# Patient Record
Sex: MALE | Race: WHITE | Employment: FULL TIME | URBAN - NONMETROPOLITAN AREA
[De-identification: names, ages, dates, MRNs, and addresses within clinical notes are randomized per-mention and may not be internally consistent; named-entity substitution may affect disease eponyms.]

---

## 2018-08-16 ENCOUNTER — HOSPITAL ENCOUNTER (EMERGENCY)
Age: 73
Discharge: HOME | End: 2018-08-16
Payer: MEDICARE

## 2018-08-16 ENCOUNTER — APPOINTMENT (OUTPATIENT)
Dept: CT IMAGING | Age: 73
End: 2018-08-16
Payer: MEDICARE

## 2018-08-16 VITALS
HEIGHT: 72 IN | WEIGHT: 165 LBS | DIASTOLIC BLOOD PRESSURE: 82 MMHG | HEART RATE: 52 BPM | TEMPERATURE: 98.5 F | OXYGEN SATURATION: 94 % | RESPIRATION RATE: 18 BRPM | SYSTOLIC BLOOD PRESSURE: 148 MMHG | BODY MASS INDEX: 22.35 KG/M2

## 2018-08-16 DIAGNOSIS — R03.0 ELEVATED BP WITHOUT DIAGNOSIS OF HYPERTENSION: ICD-10-CM

## 2018-08-16 DIAGNOSIS — R42 VERTIGO: Primary | ICD-10-CM

## 2018-08-16 LAB
ALBUMIN SERPL-MCNC: 4.2 G/DL (ref 3.5–5.2)
ALP BLD-CCNC: 79 U/L (ref 40–130)
ALT SERPL-CCNC: 21 U/L (ref 5–41)
ANION GAP SERPL CALCULATED.3IONS-SCNC: 14 MMOL/L (ref 7–19)
AST SERPL-CCNC: 20 U/L (ref 5–40)
BASOPHILS ABSOLUTE: 0.1 K/UL (ref 0–0.2)
BASOPHILS RELATIVE PERCENT: 0.9 % (ref 0–1)
BILIRUB SERPL-MCNC: 0.4 MG/DL (ref 0.2–1.2)
BILIRUBIN URINE: NEGATIVE
BLOOD, URINE: NEGATIVE
BUN BLDV-MCNC: 22 MG/DL (ref 8–23)
CALCIUM SERPL-MCNC: 8.9 MG/DL (ref 8.8–10.2)
CHLORIDE BLD-SCNC: 102 MMOL/L (ref 98–111)
CLARITY: ABNORMAL
CO2: 25 MMOL/L (ref 22–29)
COLOR: YELLOW
CREAT SERPL-MCNC: 1.2 MG/DL (ref 0.5–1.2)
EOSINOPHILS ABSOLUTE: 0.1 K/UL (ref 0–0.6)
EOSINOPHILS RELATIVE PERCENT: 2.4 % (ref 0–5)
GFR NON-AFRICAN AMERICAN: 60
GLUCOSE BLD-MCNC: 106 MG/DL (ref 74–109)
GLUCOSE URINE: NEGATIVE MG/DL
HCT VFR BLD CALC: 42.9 % (ref 42–52)
HEMOGLOBIN: 14.4 G/DL (ref 14–18)
KETONES, URINE: NEGATIVE MG/DL
LEUKOCYTE ESTERASE, URINE: NEGATIVE
LYMPHOCYTES ABSOLUTE: 1.8 K/UL (ref 1.1–4.5)
LYMPHOCYTES RELATIVE PERCENT: 33 % (ref 20–40)
MCH RBC QN AUTO: 28.7 PG (ref 27–31)
MCHC RBC AUTO-ENTMCNC: 33.6 G/DL (ref 33–37)
MCV RBC AUTO: 85.6 FL (ref 80–94)
MONOCYTES ABSOLUTE: 0.4 K/UL (ref 0–0.9)
MONOCYTES RELATIVE PERCENT: 7.3 % (ref 0–10)
NEUTROPHILS ABSOLUTE: 3 K/UL (ref 1.5–7.5)
NEUTROPHILS RELATIVE PERCENT: 56.2 % (ref 50–65)
NITRITE, URINE: NEGATIVE
PDW BLD-RTO: 12.3 % (ref 11.5–14.5)
PH UA: 7.5
PLATELET # BLD: 188 K/UL (ref 130–400)
PMV BLD AUTO: 9.5 FL (ref 9.4–12.4)
POTASSIUM SERPL-SCNC: 4.2 MMOL/L (ref 3.5–5)
PROTEIN UA: NEGATIVE MG/DL
RBC # BLD: 5.01 M/UL (ref 4.7–6.1)
SODIUM BLD-SCNC: 141 MMOL/L (ref 136–145)
SPECIFIC GRAVITY UA: 1.02
TOTAL PROTEIN: 7.1 G/DL (ref 6.6–8.7)
TROPONIN: <0.01 NG/ML (ref 0–0.03)
URINE REFLEX TO CULTURE: ABNORMAL
UROBILINOGEN, URINE: 1 E.U./DL
WBC # BLD: 5.4 K/UL (ref 4.8–10.8)

## 2018-08-16 PROCEDURE — 6360000002 HC RX W HCPCS: Performed by: NURSE PRACTITIONER

## 2018-08-16 PROCEDURE — 99284 EMERGENCY DEPT VISIT MOD MDM: CPT | Performed by: NURSE PRACTITIONER

## 2018-08-16 PROCEDURE — 99284 EMERGENCY DEPT VISIT MOD MDM: CPT

## 2018-08-16 PROCEDURE — 6370000000 HC RX 637 (ALT 250 FOR IP): Performed by: NURSE PRACTITIONER

## 2018-08-16 PROCEDURE — 80053 COMPREHEN METABOLIC PANEL: CPT

## 2018-08-16 PROCEDURE — 70450 CT HEAD/BRAIN W/O DYE: CPT

## 2018-08-16 PROCEDURE — 2580000003 HC RX 258: Performed by: NURSE PRACTITIONER

## 2018-08-16 PROCEDURE — 84484 ASSAY OF TROPONIN QUANT: CPT

## 2018-08-16 PROCEDURE — 93005 ELECTROCARDIOGRAM TRACING: CPT

## 2018-08-16 PROCEDURE — 36415 COLL VENOUS BLD VENIPUNCTURE: CPT

## 2018-08-16 PROCEDURE — 85025 COMPLETE CBC W/AUTO DIFF WBC: CPT

## 2018-08-16 PROCEDURE — 96374 THER/PROPH/DIAG INJ IV PUSH: CPT

## 2018-08-16 PROCEDURE — 81003 URINALYSIS AUTO W/O SCOPE: CPT

## 2018-08-16 RX ORDER — MECLIZINE HYDROCHLORIDE 25 MG/1
25 TABLET ORAL 3 TIMES DAILY PRN
Qty: 20 TABLET | Refills: 0 | Status: SHIPPED | OUTPATIENT
Start: 2018-08-16 | End: 2018-08-23

## 2018-08-16 RX ORDER — PANTOPRAZOLE SODIUM 40 MG/1
40 GRANULE, DELAYED RELEASE ORAL
COMMUNITY

## 2018-08-16 RX ORDER — ONDANSETRON 2 MG/ML
4 INJECTION INTRAMUSCULAR; INTRAVENOUS ONCE
Status: COMPLETED | OUTPATIENT
Start: 2018-08-16 | End: 2018-08-16

## 2018-08-16 RX ORDER — MECLIZINE HCL 12.5 MG/1
25 TABLET ORAL ONCE
Status: COMPLETED | OUTPATIENT
Start: 2018-08-16 | End: 2018-08-16

## 2018-08-16 RX ORDER — 0.9 % SODIUM CHLORIDE 0.9 %
1000 INTRAVENOUS SOLUTION INTRAVENOUS ONCE
Status: COMPLETED | OUTPATIENT
Start: 2018-08-16 | End: 2018-08-16

## 2018-08-16 RX ADMIN — ONDANSETRON 4 MG: 2 INJECTION INTRAMUSCULAR; INTRAVENOUS at 18:44

## 2018-08-16 RX ADMIN — SODIUM CHLORIDE 1000 ML: 9 INJECTION, SOLUTION INTRAVENOUS at 18:44

## 2018-08-16 RX ADMIN — MECLIZINE 25 MG: 12.5 TABLET ORAL at 18:44

## 2018-08-16 ASSESSMENT — ENCOUNTER SYMPTOMS: RESPIRATORY NEGATIVE: 1

## 2018-08-16 NOTE — ED PROVIDER NOTES
[facility]  eMERGENCY dEPARTMENT eNCOUnter      Pt Name: Isra Crouch  MRN: [id number]  [place] 1945  Date of evaluation: 8/16/2018  Provider: Darion Fletcher, [address]       Chief Complaint   Patient presents with    Dizziness     worse when pt gets up          HISTORY OF PRESENT ILLNESS   (Location/Symptom, Timing/Onset, Context/Setting, Quality, Duration, Modifying Factors, Severity)  Note limiting factors. Isra Crouch is a 81 y.o. male who presents to the emergency department for evaluation of dizziness. Pt tells me that he has had dizziness described as \"feeling like I'm drunk\" with imbalance and sometimes associated with spinning sensation. He relates that symptoms began 3 days ago are intermittent lasting several minutes in duration and occur with position changes. He tells me that he has had headache today along both sides of scalp that has improved with otc ibuprofen. He has had no vision change or lateralizing numbness weakness. He denies fever as well as head injury. He has had no lateralizing numbness/weakness. He has had no chest pain or shortness of breath. He denies history of diabetes as well as any vomiting/diarrhea. HPI    Nursing Notes were reviewed. REVIEW OF SYSTEMS    (2-9 systems for level 4, 10 or more for level 5)     Review of Systems   Constitutional: Negative. Respiratory: Negative. Cardiovascular: Negative. Neurological: Positive for dizziness. A complete review of systems was performed and is negative except as noted above in the HPI.        PAST MEDICAL HISTORY     Past Medical History:   Diagnosis Date    GERD (gastroesophageal reflux disease)          SURGICAL HISTORY       Past Surgical History:   Procedure Laterality Date    FOOT SURGERY Right          CURRENT MEDICATIONS       Previous Medications    PANTOPRAZOLE SODIUM (PROTONIX) 40 MG PACK PACKET    Take 40 mg by mouth every morning (before breakfast)       ALLERGIES

## 2018-08-19 LAB
EKG P AXIS: 66 DEGREES
EKG P-R INTERVAL: 164 MS
EKG Q-T INTERVAL: 454 MS
EKG QRS DURATION: 146 MS
EKG QTC CALCULATION (BAZETT): 447 MS
EKG T AXIS: 36 DEGREES

## 2018-08-22 ENCOUNTER — OFFICE VISIT (OUTPATIENT)
Dept: OTOLARYNGOLOGY | Age: 73
End: 2018-08-22
Payer: MEDICARE

## 2018-08-22 VITALS
HEART RATE: 59 BPM | HEIGHT: 72 IN | DIASTOLIC BLOOD PRESSURE: 70 MMHG | TEMPERATURE: 98.7 F | BODY MASS INDEX: 27.22 KG/M2 | SYSTOLIC BLOOD PRESSURE: 116 MMHG | WEIGHT: 201 LBS | RESPIRATION RATE: 16 BRPM | OXYGEN SATURATION: 98 %

## 2018-08-22 DIAGNOSIS — R42 VERTIGO: Primary | ICD-10-CM

## 2018-08-22 DIAGNOSIS — R42 DIZZINESS: ICD-10-CM

## 2018-08-22 PROCEDURE — 99203 OFFICE O/P NEW LOW 30 MIN: CPT | Performed by: OTOLARYNGOLOGY

## 2018-08-22 NOTE — PROGRESS NOTES
[facility]  [address] [place] [address]  Dept: [phone]  Dept Fax: [phone]: [phone]    Merrick Salazar is a 81 y.o. male who presents today for his medical conditions/complaints as noted below. Merrick Salazar is c/o of New Patient (Referred by ED for vertigo; patient reports it started 1 week ago )      Past Medical History:   Diagnosis Date    GERD (gastroesophageal reflux disease)       Past Surgical History:   Procedure Laterality Date    FOOT SURGERY Right        History reviewed. No pertinent family history. Social History   Substance Use Topics    Smoking status: Former Smoker    Smokeless tobacco: Never Used    Alcohol use No      Current Outpatient Prescriptions   Medication Sig Dispense Refill    pantoprazole sodium (PROTONIX) 40 MG PACK packet Take 40 mg by mouth every morning (before breakfast)      meclizine (ANTIVERT) 25 MG tablet Take 1 tablet by mouth 3 times daily as needed for Dizziness or Nausea 20 tablet 0     No current facility-administered medications for this visit. No Known Allergies    Subjective:    BPV symptoms  The patient is a 81 y.o. with a recent history dizziness characterized by a sense of motion or rotational vertigo. The patient may precipitate the onset of dizziness by moving their head rapidly or changing position rapidly. The dizziness is short lived and usually subsides within 30 seconds. The intensity of the dizziness has not increased. The patient has no other associated neurologic symptoms. There has had no abrupt alteration in hearing or significant alteration in level of tinnitus. There has been no loss of consciousness and no associated headache. Review of Systems  A 12 point review of systems was completed, reviewed, and scanned to chart per staff. Patient medical history reviewed.     Objective:     Physical Exam   Constitutional: He is oriented to person, place, and

## 2021-05-21 ENCOUNTER — IMMUNIZATION (OUTPATIENT)
Age: 76
End: 2021-05-21
Payer: MEDICARE

## 2021-05-21 PROCEDURE — 0001A PR IMM ADMN SARSCOV2 30MCG/0.3ML DIL RECON 1ST DOSE: CPT | Performed by: FAMILY MEDICINE

## 2021-05-21 PROCEDURE — 91300 COVID-19, PFIZER VACCINE 30MCG/0.3ML DOSE: CPT | Performed by: FAMILY MEDICINE

## 2021-06-11 ENCOUNTER — IMMUNIZATION (OUTPATIENT)
Age: 76
End: 2021-06-11
Payer: MEDICARE

## 2021-06-11 PROCEDURE — 91300 COVID-19, PFIZER VACCINE 30MCG/0.3ML DOSE: CPT | Performed by: FAMILY MEDICINE

## 2021-06-11 PROCEDURE — 0002A COVID-19, PFIZER VACCINE 30MCG/0.3ML DOSE: CPT | Performed by: FAMILY MEDICINE

## 2022-11-19 ENCOUNTER — APPOINTMENT (OUTPATIENT)
Dept: CARDIAC CATH/INVASIVE PROCEDURES | Age: 77
DRG: 247 | End: 2022-11-19
Payer: MEDICARE

## 2022-11-19 ENCOUNTER — HOSPITAL ENCOUNTER (INPATIENT)
Age: 77
LOS: 1 days | Discharge: HOME | DRG: 247 | End: 2022-11-20
Attending: EMERGENCY MEDICINE | Admitting: HOSPITALIST
Payer: MEDICARE

## 2022-11-19 ENCOUNTER — APPOINTMENT (OUTPATIENT)
Dept: GENERAL RADIOLOGY | Age: 77
DRG: 247 | End: 2022-11-19
Payer: MEDICARE

## 2022-11-19 DIAGNOSIS — R07.9 CHEST PAIN, UNSPECIFIED TYPE: ICD-10-CM

## 2022-11-19 DIAGNOSIS — R77.8 ELEVATED TROPONIN: Primary | ICD-10-CM

## 2022-11-19 PROBLEM — R79.89 ELEVATED TROPONIN: Status: ACTIVE | Noted: 2022-11-19

## 2022-11-19 PROBLEM — I21.4 NSTEMI (NON-ST ELEVATED MYOCARDIAL INFARCTION) (HCC): Status: ACTIVE | Noted: 2022-11-19

## 2022-11-19 LAB
ALBUMIN SERPL-MCNC: 4.3 G/DL (ref 3.5–5.2)
ALP BLD-CCNC: 95 U/L (ref 40–130)
ALT SERPL-CCNC: 18 U/L (ref 5–41)
ANION GAP SERPL CALCULATED.3IONS-SCNC: 10 MMOL/L (ref 7–19)
ANION GAP SERPL CALCULATED.3IONS-SCNC: 6 MMOL/L (ref 7–19)
APTT: 117.5 SEC (ref 26–36.2)
APTT: 32.2 SEC (ref 26–36.2)
APTT: 67.6 SEC (ref 26–36.2)
AST SERPL-CCNC: 20 U/L (ref 5–40)
BASOPHILS ABSOLUTE: 0 K/UL (ref 0–0.2)
BASOPHILS RELATIVE PERCENT: 0.8 % (ref 0–1)
BILIRUB SERPL-MCNC: 0.3 MG/DL (ref 0.2–1.2)
BUN BLDV-MCNC: 20 MG/DL (ref 8–23)
BUN BLDV-MCNC: 21 MG/DL (ref 8–23)
CALCIUM SERPL-MCNC: 8.8 MG/DL (ref 8.8–10.2)
CALCIUM SERPL-MCNC: 9.2 MG/DL (ref 8.8–10.2)
CHLORIDE BLD-SCNC: 103 MMOL/L (ref 98–111)
CHLORIDE BLD-SCNC: 103 MMOL/L (ref 98–111)
CHOLESTEROL, TOTAL: 207 MG/DL (ref 160–199)
CO2: 26 MMOL/L (ref 22–29)
CO2: 28 MMOL/L (ref 22–29)
CREAT SERPL-MCNC: 0.9 MG/DL (ref 0.5–1.2)
CREAT SERPL-MCNC: 0.9 MG/DL (ref 0.5–1.2)
EOSINOPHILS ABSOLUTE: 0.3 K/UL (ref 0–0.6)
EOSINOPHILS RELATIVE PERCENT: 6.4 % (ref 0–5)
GFR SERPL CREATININE-BSD FRML MDRD: >60 ML/MIN/{1.73_M2}
GFR SERPL CREATININE-BSD FRML MDRD: >60 ML/MIN/{1.73_M2}
GLUCOSE BLD-MCNC: 103 MG/DL (ref 74–109)
GLUCOSE BLD-MCNC: 136 MG/DL (ref 74–109)
HBA1C MFR BLD: 5.6 % (ref 4–6)
HCT VFR BLD CALC: 44.4 % (ref 42–52)
HDLC SERPL-MCNC: 41 MG/DL (ref 55–121)
HEMOGLOBIN: 15.1 G/DL (ref 14–18)
IMMATURE GRANULOCYTES #: 0 K/UL
INR BLD: 1.01 (ref 0.88–1.18)
LDL CHOLESTEROL CALCULATED: 156 MG/DL
LIPASE: 23 U/L (ref 13–60)
LYMPHOCYTES ABSOLUTE: 1.9 K/UL (ref 1.1–4.5)
LYMPHOCYTES RELATIVE PERCENT: 35 % (ref 20–40)
MAGNESIUM: 2.2 MG/DL (ref 1.6–2.4)
MCH RBC QN AUTO: 29.5 PG (ref 27–31)
MCHC RBC AUTO-ENTMCNC: 34 G/DL (ref 33–37)
MCV RBC AUTO: 86.9 FL (ref 80–94)
MONOCYTES ABSOLUTE: 0.5 K/UL (ref 0–0.9)
MONOCYTES RELATIVE PERCENT: 10.2 % (ref 0–10)
NEUTROPHILS ABSOLUTE: 2.5 K/UL (ref 1.5–7.5)
NEUTROPHILS RELATIVE PERCENT: 47.2 % (ref 50–65)
PDW BLD-RTO: 12.5 % (ref 11.5–14.5)
PHOSPHORUS: 2.9 MG/DL (ref 2.5–4.5)
PLATELET # BLD: 222 K/UL (ref 130–400)
PMV BLD AUTO: 9.4 FL (ref 9.4–12.4)
POTASSIUM REFLEX MAGNESIUM: 4.5 MMOL/L (ref 3.5–5)
POTASSIUM SERPL-SCNC: 4.2 MMOL/L (ref 3.5–5)
PRO-BNP: 89 PG/ML (ref 0–1800)
PROTHROMBIN TIME: 13.2 SEC (ref 12–14.6)
RBC # BLD: 5.11 M/UL (ref 4.7–6.1)
SARS-COV-2, NAAT: NOT DETECTED
SODIUM BLD-SCNC: 137 MMOL/L (ref 136–145)
SODIUM BLD-SCNC: 139 MMOL/L (ref 136–145)
TOTAL PROTEIN: 7.1 G/DL (ref 6.6–8.7)
TRIGL SERPL-MCNC: 49 MG/DL (ref 0–149)
TROPONIN: 0.08 NG/ML (ref 0–0.03)
TROPONIN: 0.14 NG/ML (ref 0–0.03)
TROPONIN: 0.23 NG/ML (ref 0–0.03)
TROPONIN: 0.23 NG/ML (ref 0–0.03)
TROPONIN: <0.01 NG/ML (ref 0–0.03)
TSH REFLEX FT4: 3.24 UIU/ML (ref 0.35–5.5)
WBC # BLD: 5.3 K/UL (ref 4.8–10.8)

## 2022-11-19 PROCEDURE — 83036 HEMOGLOBIN GLYCOSYLATED A1C: CPT

## 2022-11-19 PROCEDURE — 6370000000 HC RX 637 (ALT 250 FOR IP)

## 2022-11-19 PROCEDURE — 84443 ASSAY THYROID STIM HORMONE: CPT

## 2022-11-19 PROCEDURE — 85025 COMPLETE CBC W/AUTO DIFF WBC: CPT

## 2022-11-19 PROCEDURE — 36415 COLL VENOUS BLD VENIPUNCTURE: CPT

## 2022-11-19 PROCEDURE — C1894 INTRO/SHEATH, NON-LASER: HCPCS

## 2022-11-19 PROCEDURE — B2151ZZ FLUOROSCOPY OF LEFT HEART USING LOW OSMOLAR CONTRAST: ICD-10-PCS | Performed by: INTERNAL MEDICINE

## 2022-11-19 PROCEDURE — 83690 ASSAY OF LIPASE: CPT

## 2022-11-19 PROCEDURE — C1725 CATH, TRANSLUMIN NON-LASER: HCPCS

## 2022-11-19 PROCEDURE — 6360000002 HC RX W HCPCS: Performed by: EMERGENCY MEDICINE

## 2022-11-19 PROCEDURE — 80053 COMPREHEN METABOLIC PANEL: CPT

## 2022-11-19 PROCEDURE — 84100 ASSAY OF PHOSPHORUS: CPT

## 2022-11-19 PROCEDURE — 93458 L HRT ARTERY/VENTRICLE ANGIO: CPT | Performed by: INTERNAL MEDICINE

## 2022-11-19 PROCEDURE — 80061 LIPID PANEL: CPT

## 2022-11-19 PROCEDURE — C1887 CATHETER, GUIDING: HCPCS

## 2022-11-19 PROCEDURE — 6370000000 HC RX 637 (ALT 250 FOR IP): Performed by: INTERNAL MEDICINE

## 2022-11-19 PROCEDURE — 92928 PRQ TCAT PLMT NTRAC ST 1 LES: CPT | Performed by: INTERNAL MEDICINE

## 2022-11-19 PROCEDURE — 92928 PRQ TCAT PLMT NTRAC ST 1 LES: CPT

## 2022-11-19 PROCEDURE — 99222 1ST HOSP IP/OBS MODERATE 55: CPT | Performed by: INTERNAL MEDICINE

## 2022-11-19 PROCEDURE — C1769 GUIDE WIRE: HCPCS

## 2022-11-19 PROCEDURE — 87635 SARS-COV-2 COVID-19 AMP PRB: CPT

## 2022-11-19 PROCEDURE — 2500000003 HC RX 250 WO HCPCS

## 2022-11-19 PROCEDURE — 83880 ASSAY OF NATRIURETIC PEPTIDE: CPT

## 2022-11-19 PROCEDURE — 99285 EMERGENCY DEPT VISIT HI MDM: CPT

## 2022-11-19 PROCEDURE — C1874 STENT, COATED/COV W/DEL SYS: HCPCS

## 2022-11-19 PROCEDURE — 6360000004 HC RX CONTRAST MEDICATION: Performed by: STUDENT IN AN ORGANIZED HEALTH CARE EDUCATION/TRAINING PROGRAM

## 2022-11-19 PROCEDURE — 2580000003 HC RX 258: Performed by: INTERNAL MEDICINE

## 2022-11-19 PROCEDURE — 2709999900 HC NON-CHARGEABLE SUPPLY

## 2022-11-19 PROCEDURE — 83735 ASSAY OF MAGNESIUM: CPT

## 2022-11-19 PROCEDURE — 93458 L HRT ARTERY/VENTRICLE ANGIO: CPT

## 2022-11-19 PROCEDURE — 96375 TX/PRO/DX INJ NEW DRUG ADDON: CPT

## 2022-11-19 PROCEDURE — 93005 ELECTROCARDIOGRAM TRACING: CPT | Performed by: INTERNAL MEDICINE

## 2022-11-19 PROCEDURE — 99153 MOD SED SAME PHYS/QHP EA: CPT

## 2022-11-19 PROCEDURE — 84484 ASSAY OF TROPONIN QUANT: CPT

## 2022-11-19 PROCEDURE — 2140000000 HC CCU INTERMEDIATE R&B

## 2022-11-19 PROCEDURE — 6370000000 HC RX 637 (ALT 250 FOR IP): Performed by: EMERGENCY MEDICINE

## 2022-11-19 PROCEDURE — 92929 HC PRQ CARD STENT W/ANGIO ADDL: CPT

## 2022-11-19 PROCEDURE — B2111ZZ FLUOROSCOPY OF MULTIPLE CORONARY ARTERIES USING LOW OSMOLAR CONTRAST: ICD-10-PCS | Performed by: INTERNAL MEDICINE

## 2022-11-19 PROCEDURE — 99152 MOD SED SAME PHYS/QHP 5/>YRS: CPT | Performed by: INTERNAL MEDICINE

## 2022-11-19 PROCEDURE — 6370000000 HC RX 637 (ALT 250 FOR IP): Performed by: HOSPITALIST

## 2022-11-19 PROCEDURE — 71045 X-RAY EXAM CHEST 1 VIEW: CPT

## 2022-11-19 PROCEDURE — 027236Z DILATION OF CORONARY ARTERY, THREE ARTERIES WITH THREE DRUG-ELUTING INTRALUMINAL DEVICES, PERCUTANEOUS APPROACH: ICD-10-PCS | Performed by: INTERNAL MEDICINE

## 2022-11-19 PROCEDURE — 85730 THROMBOPLASTIN TIME PARTIAL: CPT

## 2022-11-19 PROCEDURE — 96365 THER/PROPH/DIAG IV INF INIT: CPT

## 2022-11-19 PROCEDURE — 93005 ELECTROCARDIOGRAM TRACING: CPT | Performed by: EMERGENCY MEDICINE

## 2022-11-19 PROCEDURE — 6360000002 HC RX W HCPCS

## 2022-11-19 PROCEDURE — 92929 PR PRQ TRLUML CORONARY STENT W/ANGIO ADDL ART/BRNCH: CPT | Performed by: INTERNAL MEDICINE

## 2022-11-19 PROCEDURE — 4A023N7 MEASUREMENT OF CARDIAC SAMPLING AND PRESSURE, LEFT HEART, PERCUTANEOUS APPROACH: ICD-10-PCS | Performed by: INTERNAL MEDICINE

## 2022-11-19 PROCEDURE — 85610 PROTHROMBIN TIME: CPT

## 2022-11-19 PROCEDURE — 99152 MOD SED SAME PHYS/QHP 5/>YRS: CPT

## 2022-11-19 RX ORDER — POTASSIUM CHLORIDE 7.45 MG/ML
10 INJECTION INTRAVENOUS PRN
Status: DISCONTINUED | OUTPATIENT
Start: 2022-11-19 | End: 2022-11-20 | Stop reason: HOSPADM

## 2022-11-19 RX ORDER — SODIUM CHLORIDE 0.9 % (FLUSH) 0.9 %
5-40 SYRINGE (ML) INJECTION EVERY 12 HOURS SCHEDULED
Status: DISCONTINUED | OUTPATIENT
Start: 2022-11-19 | End: 2022-11-20 | Stop reason: HOSPADM

## 2022-11-19 RX ORDER — ONDANSETRON 4 MG/1
4 TABLET, ORALLY DISINTEGRATING ORAL EVERY 8 HOURS PRN
Status: DISCONTINUED | OUTPATIENT
Start: 2022-11-19 | End: 2022-11-20 | Stop reason: HOSPADM

## 2022-11-19 RX ORDER — ASPIRIN 325 MG
325 TABLET ORAL ONCE
Status: COMPLETED | OUTPATIENT
Start: 2022-11-19 | End: 2022-11-19

## 2022-11-19 RX ORDER — NITROGLYCERIN 0.4 MG/1
0.4 TABLET SUBLINGUAL EVERY 5 MIN PRN
Status: DISCONTINUED | OUTPATIENT
Start: 2022-11-19 | End: 2022-11-20 | Stop reason: HOSPADM

## 2022-11-19 RX ORDER — MECOBALAMIN 5000 MCG
5 TABLET,DISINTEGRATING ORAL NIGHTLY PRN
Status: DISCONTINUED | OUTPATIENT
Start: 2022-11-19 | End: 2022-11-20 | Stop reason: HOSPADM

## 2022-11-19 RX ORDER — ACETAMINOPHEN 325 MG/1
650 TABLET ORAL EVERY 4 HOURS PRN
Status: DISCONTINUED | OUTPATIENT
Start: 2022-11-19 | End: 2022-11-20 | Stop reason: HOSPADM

## 2022-11-19 RX ORDER — POLYETHYLENE GLYCOL 3350 17 G/17G
17 POWDER, FOR SOLUTION ORAL DAILY PRN
Status: DISCONTINUED | OUTPATIENT
Start: 2022-11-19 | End: 2022-11-20 | Stop reason: HOSPADM

## 2022-11-19 RX ORDER — SODIUM CHLORIDE 9 MG/ML
INJECTION, SOLUTION INTRAVENOUS PRN
Status: DISCONTINUED | OUTPATIENT
Start: 2022-11-19 | End: 2022-11-20 | Stop reason: HOSPADM

## 2022-11-19 RX ORDER — ACETAMINOPHEN 650 MG/1
650 SUPPOSITORY RECTAL EVERY 4 HOURS PRN
Status: DISCONTINUED | OUTPATIENT
Start: 2022-11-19 | End: 2022-11-20 | Stop reason: HOSPADM

## 2022-11-19 RX ORDER — POTASSIUM CHLORIDE 20 MEQ/1
40 TABLET, EXTENDED RELEASE ORAL PRN
Status: DISCONTINUED | OUTPATIENT
Start: 2022-11-19 | End: 2022-11-20 | Stop reason: HOSPADM

## 2022-11-19 RX ORDER — ONDANSETRON 2 MG/ML
4 INJECTION INTRAMUSCULAR; INTRAVENOUS EVERY 6 HOURS PRN
Status: DISCONTINUED | OUTPATIENT
Start: 2022-11-19 | End: 2022-11-20 | Stop reason: HOSPADM

## 2022-11-19 RX ORDER — ASPIRIN 81 MG/1
81 TABLET, CHEWABLE ORAL DAILY
Status: DISCONTINUED | OUTPATIENT
Start: 2022-11-20 | End: 2022-11-20

## 2022-11-19 RX ORDER — SODIUM CHLORIDE 0.9 % (FLUSH) 0.9 %
5-40 SYRINGE (ML) INJECTION PRN
Status: DISCONTINUED | OUTPATIENT
Start: 2022-11-19 | End: 2022-11-20 | Stop reason: HOSPADM

## 2022-11-19 RX ORDER — CALCIUM CARBONATE 200(500)MG
500 TABLET,CHEWABLE ORAL 3 TIMES DAILY PRN
Status: DISCONTINUED | OUTPATIENT
Start: 2022-11-19 | End: 2022-11-20 | Stop reason: HOSPADM

## 2022-11-19 RX ORDER — ASPIRIN 81 MG/1
81 TABLET ORAL DAILY
Status: DISCONTINUED | OUTPATIENT
Start: 2022-11-20 | End: 2022-11-20 | Stop reason: HOSPADM

## 2022-11-19 RX ORDER — HEPARIN SODIUM 1000 [USP'U]/ML
4000 INJECTION, SOLUTION INTRAVENOUS; SUBCUTANEOUS PRN
Status: DISCONTINUED | OUTPATIENT
Start: 2022-11-19 | End: 2022-11-20 | Stop reason: HOSPADM

## 2022-11-19 RX ORDER — ONDANSETRON 2 MG/ML
4 INJECTION INTRAMUSCULAR; INTRAVENOUS ONCE
Status: COMPLETED | OUTPATIENT
Start: 2022-11-19 | End: 2022-11-19

## 2022-11-19 RX ORDER — SODIUM CHLORIDE 9 MG/ML
INJECTION, SOLUTION INTRAVENOUS CONTINUOUS
Status: ACTIVE | OUTPATIENT
Start: 2022-11-19 | End: 2022-11-19

## 2022-11-19 RX ORDER — CLOPIDOGREL BISULFATE 75 MG/1
75 TABLET ORAL DAILY
Status: DISCONTINUED | OUTPATIENT
Start: 2022-11-20 | End: 2022-11-20 | Stop reason: HOSPADM

## 2022-11-19 RX ORDER — ATORVASTATIN CALCIUM 40 MG/1
80 TABLET, FILM COATED ORAL NIGHTLY
Status: DISCONTINUED | OUTPATIENT
Start: 2022-11-19 | End: 2022-11-20 | Stop reason: HOSPADM

## 2022-11-19 RX ORDER — HEPARIN SODIUM 1000 [USP'U]/ML
4000 INJECTION, SOLUTION INTRAVENOUS; SUBCUTANEOUS ONCE
Status: COMPLETED | OUTPATIENT
Start: 2022-11-19 | End: 2022-11-19

## 2022-11-19 RX ORDER — FAMOTIDINE 20 MG/1
20 TABLET, FILM COATED ORAL 2 TIMES DAILY
Status: DISCONTINUED | OUTPATIENT
Start: 2022-11-19 | End: 2022-11-20 | Stop reason: HOSPADM

## 2022-11-19 RX ORDER — HEPARIN SODIUM 1000 [USP'U]/ML
2000 INJECTION, SOLUTION INTRAVENOUS; SUBCUTANEOUS PRN
Status: DISCONTINUED | OUTPATIENT
Start: 2022-11-19 | End: 2022-11-20 | Stop reason: HOSPADM

## 2022-11-19 RX ORDER — METOPROLOL SUCCINATE 25 MG/1
25 TABLET, EXTENDED RELEASE ORAL DAILY
Status: DISCONTINUED | OUTPATIENT
Start: 2022-11-19 | End: 2022-11-20 | Stop reason: HOSPADM

## 2022-11-19 RX ORDER — HEPARIN SODIUM 10000 [USP'U]/100ML
5-30 INJECTION, SOLUTION INTRAVENOUS CONTINUOUS
Status: DISCONTINUED | OUTPATIENT
Start: 2022-11-19 | End: 2022-11-19

## 2022-11-19 RX ORDER — VALSARTAN 80 MG/1
80 TABLET ORAL DAILY
Status: DISCONTINUED | OUTPATIENT
Start: 2022-11-19 | End: 2022-11-20

## 2022-11-19 RX ORDER — ESOMEPRAZOLE MAGNESIUM 20 MG/1
20 FOR SUSPENSION ORAL PRN
COMMUNITY

## 2022-11-19 RX ORDER — MAGNESIUM SULFATE IN WATER 40 MG/ML
2000 INJECTION, SOLUTION INTRAVENOUS PRN
Status: DISCONTINUED | OUTPATIENT
Start: 2022-11-19 | End: 2022-11-20 | Stop reason: HOSPADM

## 2022-11-19 RX ORDER — MORPHINE SULFATE 4 MG/ML
4 INJECTION, SOLUTION INTRAMUSCULAR; INTRAVENOUS ONCE
Status: COMPLETED | OUTPATIENT
Start: 2022-11-19 | End: 2022-11-19

## 2022-11-19 RX ADMIN — MORPHINE SULFATE 4 MG: 4 INJECTION, SOLUTION INTRAMUSCULAR; INTRAVENOUS at 01:07

## 2022-11-19 RX ADMIN — FAMOTIDINE 20 MG: 20 TABLET ORAL at 22:05

## 2022-11-19 RX ADMIN — METOPROLOL SUCCINATE 25 MG: 25 TABLET, EXTENDED RELEASE ORAL at 16:57

## 2022-11-19 RX ADMIN — NITROGLYCERIN 0.4 MG: 0.4 TABLET, ORALLY DISINTEGRATING SUBLINGUAL at 01:15

## 2022-11-19 RX ADMIN — HEPARIN SODIUM 12 UNITS/KG/HR: 10000 INJECTION, SOLUTION INTRAVENOUS at 04:14

## 2022-11-19 RX ADMIN — SODIUM CHLORIDE, PRESERVATIVE FREE 10 ML: 5 INJECTION INTRAVENOUS at 22:05

## 2022-11-19 RX ADMIN — FAMOTIDINE 20 MG: 20 TABLET ORAL at 08:50

## 2022-11-19 RX ADMIN — ASPIRIN 325 MG: 325 TABLET ORAL at 01:08

## 2022-11-19 RX ADMIN — IOPAMIDOL 275 ML: 612 INJECTION, SOLUTION INTRAVENOUS at 12:49

## 2022-11-19 RX ADMIN — VALSARTAN 80 MG: 80 TABLET, FILM COATED ORAL at 16:57

## 2022-11-19 RX ADMIN — ATORVASTATIN CALCIUM 80 MG: 40 TABLET, FILM COATED ORAL at 22:05

## 2022-11-19 RX ADMIN — HEPARIN SODIUM 4000 UNITS: 1000 INJECTION INTRAVENOUS; SUBCUTANEOUS at 04:11

## 2022-11-19 RX ADMIN — ONDANSETRON 4 MG: 2 INJECTION INTRAMUSCULAR; INTRAVENOUS at 01:08

## 2022-11-19 ASSESSMENT — ENCOUNTER SYMPTOMS
DIARRHEA: 0
BACK PAIN: 0
NAUSEA: 0
ABDOMINAL DISTENTION: 0
CONSTIPATION: 0
VOMITING: 0
SORE THROAT: 0
WHEEZING: 0
CHEST TIGHTNESS: 1
ABDOMINAL PAIN: 0
SHORTNESS OF BREATH: 0
BLOOD IN STOOL: 0
RHINORRHEA: 0
COUGH: 0

## 2022-11-19 ASSESSMENT — PAIN DESCRIPTION - DESCRIPTORS: DESCRIPTORS: PRESSURE

## 2022-11-19 ASSESSMENT — PAIN - FUNCTIONAL ASSESSMENT: PAIN_FUNCTIONAL_ASSESSMENT: 0-10

## 2022-11-19 ASSESSMENT — PAIN DESCRIPTION - LOCATION: LOCATION: CHEST

## 2022-11-19 ASSESSMENT — PAIN DESCRIPTION - ORIENTATION: ORIENTATION: MID

## 2022-11-19 ASSESSMENT — PAIN SCALES - GENERAL: PAINLEVEL_OUTOF10: 4

## 2022-11-19 NOTE — ED PROVIDER NOTES
[facility] EMERGENCY DEPT  eMERGENCY dEPARTMENT eNCOUnter      Pt Name: Jose Luis Tafoya  MRN: [id number]  [place] 1945  Date of evaluation: 11/19/2022  Provider: Srini Major MD    CHIEF COMPLAINT       Chief Complaint   Patient presents with    Chest Pain     Started about 1 hour ago. No cardiac hx. Mid-chest. Took 325mg ASA pta. HISTORY OF PRESENT ILLNESS   (Location/Symptom, Timing/Onset,Context/Setting, Quality, Duration, Modifying Factors, Severity)  Note limiting factors. Jose Luis Tafoya is a 78 y.o. male who presents to the emergency department with chest pain. Has been going on off and on for the last 2 weeks. No reliable exacerbating or relieving factors but slightly better with sitting up tonight. He states it is a pressure sensation in the center of his chest.  Has been constant since 11:00 tonight. He has no shortness of breath nausea or diaphoresis. He has no pleuritic chest pain. No calf pain or leg swelling. No history of heart disease or blood clots. No recent travel. He says his symptoms started with a cold about 3 weeks ago which is now resolved. He thought his chest was just sore from coughing. History of reflux in the past but his pain is not similar. No recent stress test.    HPI    NursingNotes were reviewed. REVIEW OF SYSTEMS    (2-9 systems for level 4, 10 or more for level 5)     Review of Systems   Constitutional:  Negative for chills and fever. HENT:  Negative for rhinorrhea and sore throat. Respiratory:  Negative for shortness of breath. Cardiovascular:  Positive for chest pain. Negative for leg swelling. Gastrointestinal:  Negative for abdominal pain, diarrhea, nausea and vomiting. Genitourinary:  Negative for difficulty urinating. Musculoskeletal:  Negative for back pain and neck pain. Skin:  Negative for rash. Neurological:  Negative for weakness and headaches. Psychiatric/Behavioral:  Negative for confusion.       A complete review of systems was performed and is negative except as noted above in the HPI. PAST MEDICAL HISTORY     Past Medical History:   Diagnosis Date    GERD (gastroesophageal reflux disease)          SURGICAL HISTORY       Past Surgical History:   Procedure Laterality Date    FOOT SURGERY Right          CURRENT MEDICATIONS       Previous Medications    No medications on file       ALLERGIES     Patient has no known allergies. FAMILY HISTORY     History reviewed. No pertinent family history. SOCIAL HISTORY       Social History     Socioeconomic History    Marital status:      Spouse name: None    Number of children: None    Years of education: None    Highest education level: None   Tobacco Use    Smoking status: Former    Smokeless tobacco: Never   Substance and Sexual Activity    Alcohol use: No    Drug use: No       SCREENINGS    Radha Coma Scale  Eye Opening: Spontaneous  Best Verbal Response: Oriented  Best Motor Response: Obeys commands  Mode Coma Scale Score: 15        PHYSICAL EXAM    (up to 7 for level 4, 8 or more for level 5)     ED Triage Vitals [11/19/22 0020]   BP Temp Temp Source Heart Rate Resp SpO2 Height Weight   (!) 202/110 97.8 °F (36.6 °C) Oral 61 16 97 % 6' (1.829 m) 180 lb (81.6 kg)       Physical Exam  Constitutional:       General: He is not in acute distress. Appearance: He is well-developed. He is not diaphoretic. HENT:      Head: Normocephalic and atraumatic. Eyes:      Pupils: Pupils are equal, round, and reactive to light. Cardiovascular:      Rate and Rhythm: Normal rate and regular rhythm. Heart sounds: Normal heart sounds. Pulmonary:      Effort: Pulmonary effort is normal. No respiratory distress. Breath sounds: Normal breath sounds. Abdominal:      General: Bowel sounds are normal. There is no distension. Palpations: Abdomen is soft. Tenderness: There is no abdominal tenderness.    Musculoskeletal:         General: Normal range of motion. Cervical back: Normal range of motion and neck supple. Skin:     General: Skin is warm and dry. Findings: No rash. Neurological:      Mental Status: He is alert and oriented to person, place, and time. Cranial Nerves: No cranial nerve deficit. Motor: No abnormal muscle tone.       Coordination: Coordination normal.   Psychiatric:         Behavior: Behavior normal.       DIAGNOSTIC RESULTS     EKG: All EKG's are interpreted by the Emergency Department Physician who either signs or Co-signs this chart in the absence of a cardiologist.    Normal sinus Rhythm rate 57 right bundle branch block which was present on previous EKG    Normal sinus rhythm Rate 54 right bundle branch block no acute ST wave abnormality    RADIOLOGY:   Non-plain film images such as CT, Ultrasound and MRI are read by the radiologist. [place] Salines images are visualized and preliminarily interpreted by the emergency physician with the below findings:        Interpretation per the Radiologist below, if available at the time of this note:    XR CHEST PORTABLE   Final Result   No acute findings in the chest.Electronically signed by Carlos Perkins MD on 11-19-22 at 3207            ED BEDSIDE ULTRASOUND:   Performed by ED Physician - none    LABS:  Labs Reviewed   CBC WITH AUTO DIFFERENTIAL - Abnormal; Notable for the following components:       Result Value    Neutrophils % 47.2 (*)     Monocytes % 10.2 (*)     Eosinophils % 6.4 (*)     All other components within normal limits   COMPREHENSIVE METABOLIC PANEL - Abnormal; Notable for the following components:    Anion Gap 6 (*)     Glucose 136 (*)     All other components within normal limits   TROPONIN - Abnormal; Notable for the following components:    Troponin 0.08 (*)     All other components within normal limits   COVID-19, RAPID   TROPONIN   APTT   BRAIN NATRIURETIC PEPTIDE   PROTIME-INR   LIPASE   APTT   APTT       All other labs were within normal range or not returned as of this dictation. EMERGENCY DEPARTMENT COURSE and DIFFERENTIALDIAGNOSIS/MDM:   Vitals:    Vitals:    11/19/22 0020   BP: (!) 202/110   Pulse: 61   Resp: 16   Temp: 97.8 °F (36.6 °C)   TempSrc: Oral   SpO2: 97%   Weight: 180 lb (81.6 kg)   Height: 6' (1.829 m)       MDM    Pt's blood pressure is improved at this time. Troponin bumped from <0.01 to 0.08. his chest pain is improved. Heparin gtt ordered. D/w Dr Shadi Zhang for admission    CONSULTS:  None    PROCEDURES:  Unless otherwise notedbelow, none     Procedures    FINAL IMPRESSION     1. Elevated troponin    2.  Chest pain, unspecified type          DISPOSITION/PLAN   DISPOSITION Decision To Admit 11/19/2022 03:17:22 AM      PATIENT REFERRED TO:  @FUP@    DISCHARGE MEDICATIONS:  New Prescriptions    No medications on file          (Please note that portions of this note were completed with a voice recognition program.  Efforts were made to edit the dictations butoccasionally words are mis-transcribed.)    Myke Canela MD (electronically signed)  AttendingEmergency Physician         Myke Canela MD  11/19/22 2378

## 2022-11-19 NOTE — CONSULTS
[id number] [facility]  Cardiology Consult      Requesting MD:  Amish Ivey MD   Admit Status:         History obtained from:   [] Patient  [] Other (specify):     PROBLEM LIST:    Patient Active Problem List    Diagnosis Date Noted    NSTEMI (non-ST elevated myocardial infarction) ([facility] [address] 11/19/2022     Priority: Medium     1. Non-STEMI presentation. 2.  Hypertension. PRESENTATION: Gary Jackson is a 78y.o. year old male who presents with complaints of chest pain that began about 2 to 3 weeks ago when he started having an upper respiratory infection with cough. He would get chest pain on and off not clearly related to exertion. No prior history of cardiac disease. No tobacco use. Not on any medications. Since 11 PM yesterday evening chest pain was constant and did not relieve until coming to the ER. EKG shows sinus rhythm with right bundle branch block and no significant ST-T changes similar to prior EKGs. Troponins were rising from less than 0.1 - 0.08-0.14  Blood pressure was elevated on arrival at 202/110 mmHg. He does not check his blood pressure regularly. REVIEW OF SYSTEMS:  Review of Systems   Constitutional:  Negative for activity change, diaphoresis and fatigue. HENT:  Negative for hearing loss, nosebleeds and tinnitus. Eyes:  Negative for visual disturbance. Respiratory:  Positive for chest tightness. Negative for cough, shortness of breath and wheezing. Cardiovascular:  Negative for chest pain, palpitations and leg swelling. Gastrointestinal:  Negative for abdominal distention, abdominal pain, blood in stool, diarrhea and vomiting. Endocrine: Negative for cold intolerance, heat intolerance, polydipsia, polyphagia and polyuria. Genitourinary:  Negative for difficulty urinating, flank pain and hematuria. Musculoskeletal:  Negative for arthralgias, back pain, joint swelling and myalgias. Skin:  Negative for pallor and rash.    Neurological:  Negative for dizziness, seizures, syncope and headaches. Psychiatric/Behavioral:  Negative for behavioral problems and dysphoric mood. The patient is not nervous/anxious. Past Medical History:      Diagnosis Date    GERD (gastroesophageal reflux disease)     History of tobacco abuse as teen ager        Past Surgical History:      Procedure Laterality Date    FOOT SURGERY Right     for a heel fracture       Allergies:  Patient has no known allergies.     Past Social History:  Social History     Socioeconomic History    Marital status:      Spouse name: Not on file    Number of children: 1    Years of education: Not on file    Highest education level: Not on file   Occupational History    Occupation:      Comment: still working   Tobacco Use    Smoking status: Former     Types: Cigarettes    Smokeless tobacco: Never    Tobacco comments:     As a teen ager   Vaping Use    Vaping Use: Never used   Substance and Sexual Activity    Alcohol use: Not Currently     Comment: when he was younger socially but not heavily    Drug use: Never    Sexual activity: Not on file     Comment: had a son, has 2 surviving grand sons   Other Topics Concern    Not on file   Social History Narrative    CODE STATUS: Full Code    HEALTH CARE PROXY: his grandson, Mr. Sam Cisneros, [phone]    AMBULATES: independently    DOMICILED: has no stairs in his home but does at work, lives alone, his grand sons live nearby and he sees them regularly, he has 2 dogs that live outside     Social Determinants of Health     Financial Resource Strain: Not on file   Food Insecurity: Not on file   Transportation Needs: Not on file   Physical Activity: Not on file   Stress: Not on file   Social Connections: Not on file   Intimate Partner Violence: Not on file   Housing Stability: Not on file       Family History:       Problem Relation Age of Onset    Other Mother          of complications of neck surgery when the patient was 11years old    No Known Problems Father         Edyta Valentine seen him 6-8 times in my whole life\"    No Known Problems Sister     No Known Problems Maternal Grandmother     Stroke Maternal Grandfather         at age 61 had a CVA and  within 1-2 years of that    Other Son          in a MVA    No Known Problems Other     No Known Problems Other        Home Meds:  Prior to Admission medications    Medication Sig Start Date End Date Taking? Authorizing Provider   esomeprazole Magnesium (NEXIUM) 20 MG PACK Take 20 mg by mouth as needed   Yes Historical Provider, MD       Current Meds:   sodium chloride flush  5-40 mL IntraVENous 2 times per day    [START ON 2022] aspirin  81 mg Oral Daily    atorvastatin  80 mg Oral Nightly    famotidine  20 mg Oral BID       Current Infused Meds:   heparin (PORCINE) Infusion 11 Units/kg/hr (22 1014)    sodium chloride         Physical Exam:  Vitals:    22 1013   BP: (!) 151/81   Pulse: 53   Resp: 18   Temp: 97.1 °F (36.2 °C)   SpO2: 96%     No intake or output data in the 24 hours ending 22 1138  Estimated body mass index is 27.1 kg/m² as calculated from the following:    Height as of this encounter: 6' (1.829 m). Weight as of this encounter: 199 lb 12.8 oz (90.6 kg). Physical Exam  HENT:      Mouth/Throat:      Pharynx: No oropharyngeal exudate. Eyes:      General: No scleral icterus. Right eye: No discharge. Left eye: No discharge. Neck:      Thyroid: No thyromegaly. Vascular: No JVD. Cardiovascular:      Rate and Rhythm: Normal rate and regular rhythm. Heart sounds: No murmur heard. No friction rub. No gallop. Comments: No JVD  No edema  No significant systolic or diastolic murmurs noted  Pulmonary:      Effort: No respiratory distress. Breath sounds: No stridor. No wheezing or rales. Abdominal:      General: Bowel sounds are normal. There is no distension. Palpations: Abdomen is soft.  There is no mass. Tenderness: There is no abdominal tenderness. There is no guarding or rebound. Comments: No palpable organomegaly   Musculoskeletal:         General: No deformity. Skin:     General: Skin is warm. Coloration: Skin is not pale. Findings: No erythema or rash. Neurological:      Mental Status: He is alert and oriented to person, place, and time. Motor: No abnormal muscle tone. Coordination: Coordination normal.      Deep Tendon Reflexes: Reflexes normal.         Labs:  Recent Labs     11/19/22  0038   WBC 5.3   HGB 15.1          Recent Labs     11/19/22  0038 11/19/22  0534    139   K 4.2 4.5    103   CO2 28 26   BUN 21 20   CREATININE 0.9 0.9   LABGLOM >60 >60   MG  --  2.2   CALCIUM 9.2 8.8   PHOS  --  2.9       CK, CKMB, Troponin: @LABRCNT (CKTOTAL:3, CKMB:3, TROPONINI:3)@    Last 3 BNP:          IMAGING:  XR CHEST PORTABLE    Result Date: 11/19/2022  Patient: Jacqueline Brown  Time Out: 03:03Exam(s): FILM CXR 1 VIEW EXAM:  XR Chest, 1 ViewCLINICAL HISTORY:   Reason for exam: chest pain. TECHNIQUE:  Frontal view of the chest.COMPARISON:  05/14/2009FINDINGS:  Lungs:  Unremarkable. No acute infiltration,  atelectasis or mass. Pleural space:  Unremarkable. No pneumothorax or pleural fluid. Heart:  Unremarkable. No cardiomegaly. Mediastinum:  Unremarkable. Bones/joints:  No acute findings. No acute findings in the chest.Electronically signed by Roberto Carlos Olivares MD on 11-19-22 at [facility], [place] [address] and Plan: This is a 78y.o. year old male with no significant past medical history presenting with recent upper respiratory infection with intermittent chest pain and now protracted chest pain associate with elevated troponin consistent with non-STEMI associate with significant urgent hypertension. 1.  We will need to evaluate for obstructive CAD as etiology of presentation with non-STEMI presentation.   Unclear if hypertension triggered by obstructive disease versus untreated hypertension. Initiate antihypertensive therapy with Toprol XL and Diovan. Continue aspirin and statin therapy. Maintain on IV heparin. We will proceed with cardiac catheterization today. Risks, benefits, alternatives of cardiac catheterization/PCI discussed with the patient and full informed consent obtained.   Acceptable Mallampati score  Consent for moderate conscious sedation  ASA 3           Electronically signed by Ria Aranda MD on 11/19/2022 at 11:38 AM

## 2022-11-19 NOTE — H&P
[facility] History and Physical    Patient Information:  Patient: Abena Billings  MRN: [id number]   Acct: [de-identified]  YOB: 1945  Admit Date: 11/19/2022      Date of Service: 11/19/2022   Primary Care Physician: No primary care provider on file. Advance Directive: Full Code  Health Care Proxy: his grandson, Mr. Ward Galloway, [phone]         SUBJECTIVE:    Chief Complaint   Patient presents with    Chest Pain     Started about 1 hour ago. No cardiac hx. Mid-chest. Took 325mg ASA pta. EP Sign Out:  Chest pain, central  Elevating troponin    HPI and ROS:  Mr. Oralia Rodriguez is a pleasant 78year old  american gent. He states that he was sick a few weeks ago and was having a lot of coughing and he thought it was making his chest sore, He states that it happened three times before but just lasted a few minutes so he did not think much of it He had taken tums thinking it was indigestion, it had helped in prior nights but not tonight. The chest pain that began at 11pm was substernal and without radiation. He was in bed but was still asleep when the pain began. He states that tums was not of any help. He had taken na aspirin from his lady friend and he thinks it helped ease the pain up. The pain has been fluctuating since he got here but has not left. The character of the pain \"is dull and pressure both\". Associated symptoms are as per below. Review of Systems:   Review of Systems   Constitutional:  Negative for chills, diaphoresis, fatigue and fever. HENT:  Positive for sneezing. Respiratory:  Negative for shortness of breath. Cardiovascular:  Positive for chest pain. Negative for palpitations and leg swelling. Gastrointestinal:  Negative for abdominal pain, constipation, diarrhea and nausea. Neurological:  Negative for weakness, light-headedness and headaches. Psychiatric/Behavioral:  Negative for confusion.       Past Medical History: Diagnosis Date    GERD (gastroesophageal reflux disease)     History of tobacco abuse as teen ager      Past Psychiatric History:  Denies any    Past Surgical History:   Procedure Laterality Date    FOOT SURGERY Right     for a heel fracture     Social History       Tobacco History       Smoking Status  Former Smoking Tobacco Type  Cigarettes      Smokeless Tobacco Use  Never      Tobacco Comments  As a teen ager              Alcohol History       Alcohol Use Status  Not Currently Comment  when he was younger socially but not heavily              Drug Use       Drug Use Status  Never              Sexual Activity       Sexually Active  Not Asked Comment  had a son, has 2 surviving grand sons             CODE STATUS: Full Code  HEALTH CARE PROXY: his grandson, Mr. Farhad Corbett, [phone]  AMBULATES: independently  DOMICILED: has no stairs in his home but does at work, lives alone, his grand sons live nearby and he sees them regularly, he has 2 dogs that live outside     Family History   Problem Relation Age of Onset    Other Mother          of complications of neck surgery when the patient was 11years old    No Known Problems Father         Jahaira Bargg seen him 6-8 times in my whole life\"    No Known Problems Sister     No Known Problems Maternal Grandmother     Stroke Maternal Grandfather         at age 61 had a CVA and  within 1-2 years of that    Other Son          in a MVA    No Known Problems Other     No Known Problems Other      Allergies:   No Known Allergies    Home Medications:  Prior to Admission medications    Not on File   Nexium OTC 20mg PO daily        OBJECTIVE:    Vitals:    22 0315   BP: 138/73   Pulse:    Resp:    Temp:    SpO2:    Breathing room air    /73   Pulse 61   Temp 97.8 °F (36.6 °C) (Oral)   Resp 16   Ht 6' (1.829 m)   Wt 180 lb (81.6 kg)   SpO2 97%   BMI 24.41 kg/m²     No intake or output data in the 24 hours ending 22 4341    Physical Exam  Vitals reviewed. Constitutional:       General: He is not in acute distress. Appearance: Normal appearance. He is normal weight. He is not ill-appearing or toxic-appearing. HENT:      Head: Normocephalic and atraumatic. Nose: No congestion or rhinorrhea. Eyes:      General:         Right eye: No discharge. Left eye: No discharge. Neck:      Comments: Supple, trachea appears midline  Cardiovascular:      Rate and Rhythm: Normal rate and regular rhythm. Heart sounds: No murmur heard. No friction rub. No gallop. Pulmonary:      Effort: Pulmonary effort is normal. No respiratory distress. Breath sounds: No stridor. No wheezing, rhonchi or rales. Chest:      Chest wall: No tenderness. Abdominal:      General: Bowel sounds are normal.      Palpations: Abdomen is soft. Tenderness: There is no abdominal tenderness. There is no guarding or rebound. Musculoskeletal:      Right lower leg: No edema. Left lower leg: No edema. Skin:     General: Skin is warm. Comments: nondiaphoretic   Neurological:      Mental Status: He is alert and oriented to person, place, and time. Psychiatric:         Mood and Affect: Mood normal.         Behavior: Behavior normal.        LABORATORY DATA:    CBC:   Recent Labs     11/19/22 0038   WBC 5.3   HGB 15.1   HCT 44.4        BMP:   Recent Labs     11/19/22 0038      K 4.2      CO2 28   BUN 21   CREATININE 0.9   CALCIUM 9.2     Hepatic Profile:   Recent Labs     11/19/22 0038   AST 20   ALT 18   BILITOT 0.3   ALKPHOS 95     Coag Panel:   Recent Labs     11/19/22 0038   INR 1.01   PROTIME 13.2   APTT 32.2     Cardiac Enzymes:   Recent Labs     11/19/22 0038 11/19/22  0245   TROPONINI <0.01 0.08*     Pro-BNP:   Recent Labs     11/19/22 0038   PROBNP 89     A1C: No results for input(s): LABA1C in the last 72 hours.   TSH: Invalid input(s): LABTSH  Lipid panel: pending      EKG:   No ST changes, SR (as per report)    IMAGING:  XR CHEST PORTABLE  Result Date: 11/19/2022  No acute findings in the chest.Electronically signed by Flroa Amor MD on 11-19-22 at [address] [place]:    Patient Active Problem List   Diagnosis    NSTEMI (non-ST elevated myocardial infarction) [facility])        Chest Pain:  Tele  Admit to cardiac vides as OBSERVATION status patient  Cardio consult in AM  Trend TnI  Mag, Phos, TSH, Lipid Panel, HbA1c - will run as add ons to ED labs if able  CBC and BMP with Reflex for following AM  ASA as per protocol (324-325mg chewed STAT unless on 81ASA daily in which case 162mg chewed STAT if not yet given, THEN from following AM 81mg Daily.)  Statin as per protocol (High intensity Statin, if already on high intensity Stain of Simvasttain 80 continue it, if Lipitor 40+ then Lipitor 80 (if less than 40 just double so long as >=40), if Rosuvastatin 20mg+ then Rosuvastatin 40mg PO QHS (if less than 20 just double so long as >=20mg)  NG SL PRN CP  EKG PRN CP  TTE in AM  Heparin GGT    GERD:  Famotidine 20mg PO BID in place of home Nexium 20mg OTC    Supoportive and Prophylactic Txx:  DVT Prophylaxis: Heparin GGT as per above  GI (PUD) Ppx: not indicated as such but covered as per above  PT consult for evalutation and Txx as indicated: contraindicated  NPO except for meds  nitroGLYCERIN, heparin (porcine), heparin (porcine)      Care time of >45 minutes  Pt seen/examined and admitted to inpatient status. Inpatient status is used for patients with an expected LOS extending past two midnights due to medical therapy and or critical care needs, otherwise patients are placed to OBServation status. Signed:  Electronically signed by Marci Thakur MD on 11/19/22 at 05:04 AM CST.

## 2022-11-19 NOTE — PROGRESS NOTES
Cardiac catheterization preliminary note:    Double vessel, branch disease with likely culprit large OM1 with obstructive disease in mid LAD and first diagonal.  Dominant circumflex system. Normal LV systolic function. Successful PCI to OM1 with LEXII x1  Successful PCI to mid LAD with LEXII x1. Successful PCI to first diagonal with LEXII x1. Plan: Medical management. Hypertensive management. Continue Plavix uninterrupted for minimum 6 months.

## 2022-11-19 NOTE — PLAN OF CARE
Problem: Discharge Planning  Goal: Discharge to home or other facility with appropriate resources  11/19/2022 0546 by Hoda Etienne RN  Outcome: Progressing  11/19/2022 0546 by Hoda Etienne RN  Outcome: Progressing     Problem: Pain  Goal: Verbalizes/displays adequate comfort level or baseline comfort level  11/19/2022 0546 by Hoda Etienne RN  Outcome: Progressing  11/19/2022 0546 by Hoda Etienne RN  Outcome: Progressing     Problem: ABCDS Injury Assessment  Goal: Absence of physical injury  11/19/2022 0546 by Hoda Etienne RN  Outcome: Progressing  11/19/2022 0546 by Hoda Etienne RN  Outcome: Progressing   Electronically signed by Hoda Etienne RN on 11/19/2022 at 5:46 AM

## 2022-11-20 VITALS
HEIGHT: 72 IN | BODY MASS INDEX: 27.06 KG/M2 | DIASTOLIC BLOOD PRESSURE: 77 MMHG | SYSTOLIC BLOOD PRESSURE: 120 MMHG | WEIGHT: 199.8 LBS | OXYGEN SATURATION: 96 % | HEART RATE: 62 BPM | RESPIRATION RATE: 20 BRPM | TEMPERATURE: 98.1 F

## 2022-11-20 LAB
ANION GAP SERPL CALCULATED.3IONS-SCNC: 11 MMOL/L (ref 7–19)
BUN BLDV-MCNC: 17 MG/DL (ref 8–23)
CALCIUM SERPL-MCNC: 8.9 MG/DL (ref 8.8–10.2)
CHLORIDE BLD-SCNC: 102 MMOL/L (ref 98–111)
CO2: 24 MMOL/L (ref 22–29)
CREAT SERPL-MCNC: 1 MG/DL (ref 0.5–1.2)
GFR SERPL CREATININE-BSD FRML MDRD: >60 ML/MIN/{1.73_M2}
GLUCOSE BLD-MCNC: 115 MG/DL (ref 74–109)
HCT VFR BLD CALC: 42.2 % (ref 42–52)
HEMOGLOBIN: 14.5 G/DL (ref 14–18)
LV EF: 58 %
LVEF MODALITY: NORMAL
MCH RBC QN AUTO: 30 PG (ref 27–31)
MCHC RBC AUTO-ENTMCNC: 34.4 G/DL (ref 33–37)
MCV RBC AUTO: 87.2 FL (ref 80–94)
PDW BLD-RTO: 12.6 % (ref 11.5–14.5)
PLATELET # BLD: 215 K/UL (ref 130–400)
PMV BLD AUTO: 9.5 FL (ref 9.4–12.4)
POTASSIUM REFLEX MAGNESIUM: 4.1 MMOL/L (ref 3.5–5)
RBC # BLD: 4.84 M/UL (ref 4.7–6.1)
SODIUM BLD-SCNC: 137 MMOL/L (ref 136–145)
WBC # BLD: 7.7 K/UL (ref 4.8–10.8)

## 2022-11-20 PROCEDURE — 85027 COMPLETE CBC AUTOMATED: CPT

## 2022-11-20 PROCEDURE — 6370000000 HC RX 637 (ALT 250 FOR IP): Performed by: INTERNAL MEDICINE

## 2022-11-20 PROCEDURE — 2580000003 HC RX 258: Performed by: HOSPITALIST

## 2022-11-20 PROCEDURE — 99232 SBSQ HOSP IP/OBS MODERATE 35: CPT | Performed by: INTERNAL MEDICINE

## 2022-11-20 PROCEDURE — 6370000000 HC RX 637 (ALT 250 FOR IP): Performed by: HOSPITALIST

## 2022-11-20 PROCEDURE — 93005 ELECTROCARDIOGRAM TRACING: CPT | Performed by: EMERGENCY MEDICINE

## 2022-11-20 PROCEDURE — 36415 COLL VENOUS BLD VENIPUNCTURE: CPT

## 2022-11-20 PROCEDURE — 6360000004 HC RX CONTRAST MEDICATION: Performed by: HOSPITALIST

## 2022-11-20 PROCEDURE — C8929 TTE W OR WO FOL WCON,DOPPLER: HCPCS

## 2022-11-20 PROCEDURE — 80048 BASIC METABOLIC PNL TOTAL CA: CPT

## 2022-11-20 RX ORDER — METOPROLOL SUCCINATE 25 MG/1
25 TABLET, EXTENDED RELEASE ORAL DAILY
Qty: 30 TABLET | Refills: 1 | Status: SHIPPED | OUTPATIENT
Start: 2022-11-21

## 2022-11-20 RX ORDER — NITROGLYCERIN 0.4 MG/1
0.4 TABLET SUBLINGUAL EVERY 5 MIN PRN
Qty: 25 TABLET | Refills: 1 | Status: SHIPPED | OUTPATIENT
Start: 2022-11-20

## 2022-11-20 RX ORDER — VALSARTAN 80 MG/1
80 TABLET ORAL 2 TIMES DAILY
Qty: 60 TABLET | Refills: 1 | Status: SHIPPED | OUTPATIENT
Start: 2022-11-20

## 2022-11-20 RX ORDER — ATORVASTATIN CALCIUM 80 MG/1
80 TABLET, FILM COATED ORAL NIGHTLY
Qty: 30 TABLET | Refills: 1 | Status: SHIPPED | OUTPATIENT
Start: 2022-11-20

## 2022-11-20 RX ORDER — CLOPIDOGREL BISULFATE 75 MG/1
75 TABLET ORAL DAILY
Qty: 30 TABLET | Refills: 1 | Status: SHIPPED | OUTPATIENT
Start: 2022-11-21

## 2022-11-20 RX ORDER — ASPIRIN 81 MG/1
81 TABLET ORAL DAILY
Qty: 30 TABLET | Refills: 1 | Status: SHIPPED | OUTPATIENT
Start: 2022-11-21

## 2022-11-20 RX ORDER — VALSARTAN 80 MG/1
80 TABLET ORAL 2 TIMES DAILY
Status: DISCONTINUED | OUTPATIENT
Start: 2022-11-20 | End: 2022-11-20 | Stop reason: HOSPADM

## 2022-11-20 RX ADMIN — FAMOTIDINE 20 MG: 20 TABLET ORAL at 08:43

## 2022-11-20 RX ADMIN — CLOPIDOGREL BISULFATE 75 MG: 75 TABLET ORAL at 08:43

## 2022-11-20 RX ADMIN — VALSARTAN 80 MG: 80 TABLET, FILM COATED ORAL at 08:42

## 2022-11-20 RX ADMIN — METOPROLOL SUCCINATE 25 MG: 25 TABLET, EXTENDED RELEASE ORAL at 08:43

## 2022-11-20 RX ADMIN — ASPIRIN 81 MG: 81 TABLET, COATED ORAL at 08:42

## 2022-11-20 RX ADMIN — PERFLUTREN 1.5 ML: 6.52 INJECTION, SUSPENSION INTRAVENOUS at 10:38

## 2022-11-20 RX ADMIN — SODIUM CHLORIDE, PRESERVATIVE FREE 10 ML: 5 INJECTION INTRAVENOUS at 08:43

## 2022-11-20 NOTE — PROGRESS NOTES
Cardiology Progress Note Henrietta Rodriguez MD      Patient:  Diane Danielson  [id number]    Patient Active Problem List    Diagnosis Date Noted    Elevated troponin 11/19/2022     Priority: High    NSTEMI (non-ST elevated [facility]) [facility] 11/19/2022     Priority: Medium       Admit Date:  11/19/2022    Admission Problem List: Present on Admission:   NSTEMI (non-ST elevated myocardial infarction) ([facility]   Elevated troponin      Cardiac Specific Data:  Specialty Problems          Cardiology Problems    * (Principal) NSTEMI (non-ST elevated myocardial infarction) ([facility]         1. Coronary artery disease, non-STEMI presentation, status post PCI 11/19/2022 to OM1, mid LAD and second diagonal with LEXII x3, normal LV ejection fraction. 2.  Hypertension. Subjective:  Mr. Chet Cooks is doing well post PCI with no significant issues. Radial site without any swelling or ooze. Objective:   /77   Pulse 62   Temp 98.1 °F (36.7 °C) (Temporal)   Resp 20   Ht 6' (1.829 m)   Wt 199 lb 12.8 oz (90.6 kg)   SpO2 96%   BMI 27.10 kg/m²       Intake/Output Summary (Last 24 hours) at 11/20/2022 1235  Last data filed at 11/20/2022 1009  Gross per 24 hour   Intake 450 ml   Output --   Net 450 ml       Prior to Admission medications    Medication Sig Start Date End Date Taking? Authorizing Provider   aspirin 81 MG EC tablet Take 1 tablet by mouth daily 11/21/22  Yes Gonzalo Salazar MD   nitroGLYCERIN (NITROSTAT) 0.4 MG SL tablet Place 1 tablet under the tongue every 5 minutes as needed for Chest pain up to max of 3 total doses.  If no relief after 1 dose, call 911. 11/20/22  Yes Gonzalo Salazar MD   atorvastatin (LIPITOR) 80 MG tablet Take 1 tablet by mouth nightly 11/20/22  Yes Gonzalo Salazar MD   valsartan (DIOVAN) 80 MG tablet Take 1 tablet by mouth 2 times daily 11/20/22  Yes Gonzalo Salazar MD   metoprolol succinate (TOPROL XL) 25 MG extended release tablet Take 1 tablet by mouth daily 11/21/22  Yes Gonzalo Salazar MD clopidogrel (PLAVIX) 75 MG tablet Take 1 tablet by mouth daily 11/21/22  Yes Marina Daley MD   esomeprazole Magnesium (NEXIUM) 20 MG PACK Take 20 mg by mouth as needed   Yes Historical Provider, MD        valsartan  80 mg Oral BID    sodium chloride flush  5-40 mL IntraVENous 2 times per day    atorvastatin  80 mg Oral Nightly    famotidine  20 mg Oral BID    metoprolol succinate  25 mg Oral Daily    clopidogrel  75 mg Oral Daily    sodium chloride flush  5-40 mL IntraVENous 2 times per day    aspirin  81 mg Oral Daily       TELEMETRY: Sinus     Physical Exam:      Physical Exam  HENT:      Mouth/Throat:      Pharynx: No oropharyngeal exudate. Eyes:      General: No scleral icterus. Right eye: No discharge. Left eye: No discharge. Neck:      Thyroid: No thyromegaly. Vascular: No JVD. Cardiovascular:      Rate and Rhythm: Normal rate and regular rhythm. Heart sounds: No murmur heard. No friction rub. No gallop. Comments: No JVD  No edema  No significant systolic or diastolic murmurs noted  Pulmonary:      Effort: No respiratory distress. Breath sounds: No stridor. No wheezing or rales. Abdominal:      General: Bowel sounds are normal. There is no distension. Palpations: Abdomen is soft. There is no mass. Tenderness: There is no abdominal tenderness. There is no guarding or rebound. Musculoskeletal:         General: No deformity. Skin:     General: Skin is warm. Coloration: Skin is not pale. Findings: No erythema or rash. Neurological:      Mental Status: He is alert and oriented to person, place, and time. Motor: No abnormal muscle tone.       Coordination: Coordination normal.      Deep Tendon Reflexes: Reflexes normal.               Lab Data:  CBC:   Recent Labs     11/19/22 0038 11/20/22  0129   WBC 5.3 7.7   HGB 15.1 14.5   HCT 44.4 42.2   MCV 86.9 87.2    215     BMP:   Recent Labs     11/19/22 0038 11/19/22  0534 11/20/22  0129    139 137   K 4.2 4.5 4.1    103 102   CO2 28 26 24   PHOS  --  2.9  --    BUN 21 20 17   CREATININE 0.9 0.9 1.0     LIVER PROFILE:   Recent Labs     11/19/22  0038   AST 20   ALT 18   LIPASE 23   BILITOT 0.3   ALKPHOS 95     PT/INR:   Recent Labs     11/19/22  0038   PROTIME 13.2   INR 1.01     APTT:   Recent Labs     11/19/22  0038 11/19/22  0534 11/19/22  0907   APTT 32.2 117.5* 67.6*     BNP:  No results for input(s): BNP in the last 72 hours. CK, CKMB, Troponin: @LABRCNT (CKTOTAL:3, CKMB:3, TROPONINI:3)@    IMAGING:  XR CHEST PORTABLE    Result Date: 11/19/2022  Patient: Matt Engel  Time Out: 03:03Exam(s): FILM CXR 1 VIEW EXAM:  XR Chest, 1 ViewCLINICAL HISTORY:   Reason for exam: chest pain. TECHNIQUE:  Frontal view of the chest.COMPARISON:  05/14/2009FINDINGS:  Lungs:  Unremarkable. No acute infiltration,  atelectasis or mass. Pleural space:  Unremarkable. No pneumothorax or pleural fluid. Heart:  Unremarkable. No cardiomegaly. Mediastinum:  Unremarkable. Bones/joints:  No acute findings. No acute findings in the chest.Electronically signed by Jahaira Griffith MD on 11-19-22 at [address] [place] [address] and Plan: This is a 78y.o. year old male with no significant past medical history presenting with recent upper respiratory infection with intermittent chest pain and now protracted chest pain associate with elevated troponin consistent with non-STEMI associated with significant urgent hypertension, found to have coronary artery disease with double vessel, branch disease undergoing PCI 11/19/2022 to OM1, mid LAD and second diagonal with LEXII x3, normal LV ejection fraction. 1.  Doing well post PCI with no significant issues. Advised on compliance especially with Plavix to be maintained uninterrupted for minimum 6 months. 2.  Blood pressure appears better controlled. Increase Diovan to 80 mg twice daily. Maintain on Toprol-XL.   Aspirin and statin therapy to continue. 3.  Can follow-up as an outpatient with cardiology.         Vince Marino MD, MD 11/20/2022 12:35 PM

## 2022-11-20 NOTE — DISCHARGE SUMMARY
[place], [place], [address]    DEPARTMENT OF HOSPITALIST MEDICINE      DISCHARGE SUMMARY:      PATIENT NAME:  Cleo Greenwood  :  1945  MRN:  [id number]    Admission Date:   2022 12:33 AM Attending: Ernestine Haro MD   Discharge Date:   2022              PCP: No primary care provider on file. Length of Stay: 1 days     Chief Complaint on Admission:   Chief Complaint   Patient presents with    Chest Pain     Started about 1 hour ago. No cardiac hx. Mid-chest. Took 325mg ASA pta. Consultants:     IP CONSULT TO CARDIOLOGY  IP CONSULT TO CARDIAC REHAB  IP CONSULT TO [address] [place] [address] [facility]  COURSE  AND  TREATMENT:  78year old  american gent. He states that he was sick a few weeks ago and was having a lot of coughing and he thought it was making his chest sore, He states that it happened three times before but just lasted a few minutes so he did not think much of it He had taken tums thinking it was indigestion, it had helped in prior nights but not tonight. The chest pain that began at 11pm was substernal and without radiation. He was in bed but was still asleep when the pain began. He states that tums was not of any help. He had taken na aspirin from his lady friend and he thinks it helped ease the pain up. The pain has been fluctuating since he got here but has not left. The character of the pain \"is dull and pressure both\". He was admitted to hospitalist service for further management with cardiology consultation. Initial troponin was negative, but was subsequently positive at 0.08, 0.14, and peaked at 0.23. He was started on a heparin drip ACS protocol. He underwent LHC on day of admission that showed double vessel, branch disease with likely culprit a large OM1 with obstructive disease in mid LAD and first diagonal.  Dominant circumflex system. Underwent PCI to OM1 with LEXII x1, mLAD with LEXII x1, and first diagonal with LEXII x1.   Chest pain improved after PCI. He will continue DAPT with aspirin and Plavix uninterrupted for at least 6 months. Discharged home the following day with cardiology follow-up. Discharge Problem List:   Principal Problem:    NSTEMI (non-ST elevated myocardial infarction) ([address]  Active Problems:    Elevated troponin  Resolved Problems:    * No resolved hospital problems. *         Last dated Assessment and Plan . .. 11/19/22        OBJECTIVE:  /77   Pulse 62   Temp 98.1 °F (36.7 °C) (Temporal)   Resp 20   Ht 6' (1.829 m)   Wt 199 lb 12.8 oz (90.6 kg)   SpO2 96%   BMI 27.10 kg/m²       Heart: RRR   Lungs: Bilateral fair air entry   Abdomen: Soft, non-tender   Extremities: No edema   Neurologic: Alert and oriented   Skin: Warm and dry          Laboratory Data:  Recent Labs     11/19/22 0038 11/20/22 0129   WBC 5.3 7.7   HGB 15.1 14.5    215     Recent Labs     11/19/22 0038 11/19/22  0534 11/20/22  0129    139 137   K 4.2 4.5 4.1    103 102   CO2 28 26 24   BUN 21 20 17   CREATININE 0.9 0.9 1.0   GLUCOSE 136* 103 115*     Recent Labs     11/19/22 0038   AST 20   ALT 18   BILITOT 0.3   ALKPHOS 95     Troponin T:   Recent Labs     11/19/22  0534 11/19/22  0907 11/19/22  1256   TROPONINI 0.14* 0.23* 0.23*     Pro-BNP: No results for input(s): BNP in the last 72 hours. INR:   Recent Labs     11/19/22 0038   INR 1.01     UA:No results for input(s): NITRITE, COLORU, PHUR, LABCAST, WBCUA, RBCUA, MUCUS, TRICHOMONAS, YEAST, BACTERIA, CLARITYU, SPECGRAV, LEUKOCYTESUR, UROBILINOGEN, BILIRUBINUR, BLOODU, GLUCOSEU, AMORPHOUS in the last 72 hours. Invalid input(s): KETONESU  A1C:   Recent Labs     11/19/22 0038   LABA1C 5.6     ABG:No results for input(s): PHART, PIH7FUG, PO2ART, NNO3LQC, BEART, HGBAE, D3GLTRXZ, CARBOXHGBART in the last 72 hours.          Impressions of imaging performed in 48 hours before discharge:    XR CHEST PORTABLE    Result Date: 11/19/2022  Patient: Rosario Butler  Time Out: 03: 03Exam(s): FILM CXR 1 VIEW EXAM:  XR Chest, 1 ViewCLINICAL HISTORY:   Reason for exam: chest pain. TECHNIQUE:  Frontal view of the chest.COMPARISON:  05/14/2009FINDINGS:  Lungs:  Unremarkable. No acute infiltration,  atelectasis or mass. Pleural space:  Unremarkable. No pneumothorax or pleural fluid. Heart:  Unremarkable. No cardiomegaly. Mediastinum:  Unremarkable. Bones/joints:  No acute findings. No acute findings in the chest.Electronically signed by Juan Varner MD on 11-19-22 at 0303          Medication List        START taking these medications      aspirin 81 MG EC tablet  Take 1 tablet by mouth daily  Start taking on: November 21, 2022     atorvastatin 80 MG tablet  Commonly known as: LIPITOR  Take 1 tablet by mouth nightly     clopidogrel 75 MG tablet  Commonly known as: PLAVIX  Take 1 tablet by mouth daily  Start taking on: November 21, 2022     metoprolol succinate 25 MG extended release tablet  Commonly known as: TOPROL XL  Take 1 tablet by mouth daily  Start taking on: November 21, 2022     nitroGLYCERIN 0.4 MG SL tablet  Commonly known as: NITROSTAT  Place 1 tablet under the tongue every 5 minutes as needed for Chest pain up to max of 3 total doses.  If no relief after 1 dose, call 911.     valsartan 80 MG tablet  Commonly known as: DIOVAN  Take 1 tablet by mouth 2 times daily            CONTINUE taking these medications      esomeprazole Magnesium 20 MG Pack  Commonly known as: NEXIUM            STOP taking these medications      pantoprazole sodium 40 MG Pack packet  Commonly known as: PROTONIX               Where to Get Your Medications        These medications were sent to [facility]pharmacy #[id number], [address] [place], [place] [address]e  [address] [place] [address] RD[address] [place] [id number]      Hours: 24-hours Phone: [phone]   aspirin 81 MG EC tablet  atorvastatin 80 MG tablet  clopidogrel 75 MG tablet  metoprolol succinate 25 MG extended release tablet  nitroGLYCERIN 0.4 MG SL tablet  valsartan 80 MG tablet           ISSUES TO BE ADDRESSED AT HOSPITAL FOLLOW-UP VISIT:    Follow-up with cardiology as scheduled below. Continue aspirin and Plavix uninterrupted for at least 6 months post PCI. Also started on atorvastatin, Toprol-XL, and valsartan. Advised patient to follow-up closely with PCP upon discharge for management of chronic medical issues    Condition on Discharge: stable  Discharge Disposition: Home    Recommended Follow Up:  No follow-up provider specified. Followup Appointments Scheduled at Time of Discharge:  No future appointments. Discharge Instructions:   Please see the discharge paperwork. Patient was seen at bedside today, and the examination shows improvement since yesterday. Detailed discharge directions delivered to the patient by myself and our nursing staff, who verbalizes understanding and is satisfied with the plan. Patient has been advised to continue all medications as prescribed and advised, and f/u with PCP within 1 week. Patient is stable from medical standpoint to be discharged. Total time spent during patient evaluation and assessment, discussion with the nurse/family, addressing discharge medications/scripts and coordination of care for safe discharge was in excess of 33 minutes.       Signed Electronically:    Antonio Torres MD  1:20 PM 11/20/2022

## 2022-11-21 LAB
EKG P AXIS: 103 DEGREES
EKG P AXIS: 44 DEGREES
EKG P AXIS: 63 DEGREES
EKG P AXIS: 68 DEGREES
EKG P-R INTERVAL: 172 MS
EKG P-R INTERVAL: 176 MS
EKG P-R INTERVAL: 180 MS
EKG P-R INTERVAL: 180 MS
EKG Q-T INTERVAL: 446 MS
EKG Q-T INTERVAL: 456 MS
EKG Q-T INTERVAL: 458 MS
EKG Q-T INTERVAL: 466 MS
EKG QRS DURATION: 136 MS
EKG QRS DURATION: 138 MS
EKG QRS DURATION: 144 MS
EKG QRS DURATION: 96 MS
EKG QTC CALCULATION (BAZETT): 449 MS
EKG QTC CALCULATION (BAZETT): 449 MS
EKG QTC CALCULATION (BAZETT): 456 MS
EKG QTC CALCULATION (BAZETT): 472 MS
EKG T AXIS: 108 DEGREES
EKG T AXIS: 19 DEGREES
EKG T AXIS: 27 DEGREES
EKG T AXIS: 28 DEGREES

## 2022-11-21 PROCEDURE — 93010 ELECTROCARDIOGRAM REPORT: CPT | Performed by: INTERNAL MEDICINE

## 2022-11-21 NOTE — CONSULTS
Cardiac Rehab MI/PTCA/Stent education packet was sent to the patient's address on record. Handouts titled; \"Home Instructions Following a Cardiac Event\", \"A Healthy Heart: Care Instructions\",  \"Cardiac Diet/Low Cholesterol\", \"Cardiac Rehabilitation: An Individualized Supervised Program For You\" and a [facility] Cardiac & Pulmonary Rehabilitation brochure were included. Patient was instructed to contact [facility] to enroll in Phase II Outpatient Cardiac Rehab.

## 2022-11-23 ENCOUNTER — HOSPITAL ENCOUNTER (INPATIENT)
Age: 77
LOS: 2 days | Discharge: HOME | DRG: 682 | End: 2022-11-25
Attending: HOSPITALIST | Admitting: HOSPITALIST
Payer: MEDICARE

## 2022-11-23 ENCOUNTER — TELEPHONE (OUTPATIENT)
Dept: CARDIOLOGY CLINIC | Age: 77
End: 2022-11-23

## 2022-11-23 ENCOUNTER — APPOINTMENT (OUTPATIENT)
Dept: GENERAL RADIOLOGY | Age: 77
DRG: 682 | End: 2022-11-23
Payer: MEDICARE

## 2022-11-23 ENCOUNTER — OFFICE VISIT (OUTPATIENT)
Age: 77
End: 2022-11-23
Payer: MEDICARE

## 2022-11-23 VITALS
DIASTOLIC BLOOD PRESSURE: 70 MMHG | RESPIRATION RATE: 16 BRPM | WEIGHT: 203 LBS | TEMPERATURE: 98.6 F | SYSTOLIC BLOOD PRESSURE: 132 MMHG | HEIGHT: 72 IN | BODY MASS INDEX: 27.5 KG/M2 | OXYGEN SATURATION: 96 % | HEART RATE: 60 BPM

## 2022-11-23 DIAGNOSIS — U07.1 COVID-19: ICD-10-CM

## 2022-11-23 DIAGNOSIS — R35.0 URINARY FREQUENCY: Primary | ICD-10-CM

## 2022-11-23 DIAGNOSIS — R33.9 URINARY RETENTION: ICD-10-CM

## 2022-11-23 DIAGNOSIS — N17.9 AKI (ACUTE KIDNEY INJURY) (HCC): Primary | ICD-10-CM

## 2022-11-23 PROBLEM — Z98.890 HISTORY OF CARDIAC CATH: Status: ACTIVE | Noted: 2022-11-23

## 2022-11-23 LAB
ALBUMIN SERPL-MCNC: 4.2 G/DL (ref 3.5–5.2)
ALBUMIN SERPL-MCNC: 4.2 G/DL (ref 3.5–5.2)
ALP BLD-CCNC: 101 U/L (ref 40–130)
ALP BLD-CCNC: 97 U/L (ref 40–130)
ALT SERPL-CCNC: 20 U/L (ref 5–41)
ALT SERPL-CCNC: 20 U/L (ref 5–41)
ANION GAP SERPL CALCULATED.3IONS-SCNC: 10 MMOL/L (ref 7–19)
ANION GAP SERPL CALCULATED.3IONS-SCNC: 12 MMOL/L (ref 7–19)
APPEARANCE FLUID: ABNORMAL
AST SERPL-CCNC: 21 U/L (ref 5–40)
AST SERPL-CCNC: 22 U/L (ref 5–40)
BACTERIA: NEGATIVE /HPF
BASOPHILS ABSOLUTE: 0 K/UL (ref 0–0.2)
BASOPHILS ABSOLUTE: 0.1 K/UL (ref 0–0.2)
BASOPHILS RELATIVE PERCENT: 0.6 % (ref 0–1)
BASOPHILS RELATIVE PERCENT: 0.7 % (ref 0–1)
BILIRUB SERPL-MCNC: 0.8 MG/DL (ref 0.2–1.2)
BILIRUB SERPL-MCNC: 0.9 MG/DL (ref 0.2–1.2)
BILIRUBIN URINE: NEGATIVE
BILIRUBIN, POC: ABNORMAL
BLOOD URINE, POC: ABNORMAL
BLOOD, URINE: ABNORMAL
BUN BLDV-MCNC: 38 MG/DL (ref 8–23)
BUN BLDV-MCNC: 41 MG/DL (ref 8–23)
C-REACTIVE PROTEIN: 3.38 MG/DL (ref 0–0.5)
CALCIUM SERPL-MCNC: 8.6 MG/DL (ref 8.8–10.2)
CALCIUM SERPL-MCNC: 8.9 MG/DL (ref 8.8–10.2)
CHLORIDE BLD-SCNC: 102 MMOL/L (ref 98–111)
CHLORIDE BLD-SCNC: 99 MMOL/L (ref 98–111)
CLARITY, POC: CLEAR
CLARITY: CLEAR
CO2: 25 MMOL/L (ref 22–29)
CO2: 26 MMOL/L (ref 22–29)
COLOR, POC: YELLOW
COLOR: YELLOW
CREAT SERPL-MCNC: 1.9 MG/DL (ref 0.5–1.2)
CREAT SERPL-MCNC: 2.2 MG/DL (ref 0.5–1.2)
CREATININE URINE: 128.9 MG/DL (ref 4.2–622)
CRYSTALS, UA: ABNORMAL /HPF
D DIMER: 1.7 UG/ML FEU (ref 0–0.48)
EOSINOPHIL,URINE: NORMAL
EOSINOPHILS ABSOLUTE: 0.6 K/UL (ref 0–0.6)
EOSINOPHILS ABSOLUTE: 0.6 K/UL (ref 0–0.6)
EOSINOPHILS RELATIVE PERCENT: 6.8 % (ref 0–5)
EOSINOPHILS RELATIVE PERCENT: 7.9 % (ref 0–5)
EPITHELIAL CELLS, UA: 3 /HPF (ref 0–5)
GFR SERPL CREATININE-BSD FRML MDRD: 30 ML/MIN/{1.73_M2}
GFR SERPL CREATININE-BSD FRML MDRD: 36 ML/MIN/{1.73_M2}
GLUCOSE BLD-MCNC: 138 MG/DL (ref 74–109)
GLUCOSE BLD-MCNC: 97 MG/DL (ref 74–109)
GLUCOSE URINE, POC: ABNORMAL
GLUCOSE URINE: NEGATIVE MG/DL
HCT VFR BLD CALC: 41.9 % (ref 42–52)
HCT VFR BLD CALC: 43.6 % (ref 42–52)
HEMOGLOBIN: 14 G/DL (ref 14–18)
HEMOGLOBIN: 14.2 G/DL (ref 14–18)
HYALINE CASTS: 15 /HPF (ref 0–8)
IMMATURE GRANULOCYTES #: 0 K/UL
IMMATURE GRANULOCYTES #: 0 K/UL
KETONES, POC: ABNORMAL
KETONES, URINE: NEGATIVE MG/DL
LEUKOCYTE EST, POC: ABNORMAL
LEUKOCYTE ESTERASE, URINE: NEGATIVE
LYMPHOCYTES ABSOLUTE: 1.1 K/UL (ref 1.1–4.5)
LYMPHOCYTES ABSOLUTE: 1.1 K/UL (ref 1.1–4.5)
LYMPHOCYTES RELATIVE PERCENT: 14.2 % (ref 20–40)
LYMPHOCYTES RELATIVE PERCENT: 15 % (ref 20–40)
MAGNESIUM: 3 MG/DL (ref 1.6–2.4)
MCH RBC QN AUTO: 29 PG (ref 27–31)
MCH RBC QN AUTO: 29.3 PG (ref 27–31)
MCHC RBC AUTO-ENTMCNC: 32.6 G/DL (ref 33–37)
MCHC RBC AUTO-ENTMCNC: 33.4 G/DL (ref 33–37)
MCV RBC AUTO: 87.7 FL (ref 80–94)
MCV RBC AUTO: 89 FL (ref 80–94)
MONOCYTES ABSOLUTE: 0.6 K/UL (ref 0–0.9)
MONOCYTES ABSOLUTE: 0.7 K/UL (ref 0–0.9)
MONOCYTES RELATIVE PERCENT: 8.2 % (ref 0–10)
MONOCYTES RELATIVE PERCENT: 8.3 % (ref 0–10)
NEUTROPHILS ABSOLUTE: 4.8 K/UL (ref 1.5–7.5)
NEUTROPHILS ABSOLUTE: 5.6 K/UL (ref 1.5–7.5)
NEUTROPHILS RELATIVE PERCENT: 67.9 % (ref 50–65)
NEUTROPHILS RELATIVE PERCENT: 69.7 % (ref 50–65)
NITRITE, POC: ABNORMAL
NITRITE, URINE: NEGATIVE
PARATHYROID HORMONE INTACT: 84 PG/ML (ref 15–65)
PDW BLD-RTO: 12.7 % (ref 11.5–14.5)
PDW BLD-RTO: 12.8 % (ref 11.5–14.5)
PH UA: 5 (ref 5–8)
PH, POC: 5.5
PHOSPHORUS: 3.8 MG/DL (ref 2.5–4.5)
PLATELET # BLD: 172 K/UL (ref 130–400)
PLATELET # BLD: 173 K/UL (ref 130–400)
PMV BLD AUTO: 9.6 FL (ref 9.4–12.4)
PMV BLD AUTO: 9.7 FL (ref 9.4–12.4)
POTASSIUM REFLEX MAGNESIUM: 3.7 MMOL/L (ref 3.5–5)
POTASSIUM SERPL-SCNC: 4.1 MMOL/L (ref 3.5–5)
PRO-BNP: 192 PG/ML (ref 0–1800)
PROTEIN UA: NEGATIVE MG/DL
PROTEIN, POC: ABNORMAL
RBC # BLD: 4.78 M/UL (ref 4.7–6.1)
RBC # BLD: 4.9 M/UL (ref 4.7–6.1)
RBC UA: 6 /HPF (ref 0–4)
SARS-COV-2, NAAT: DETECTED
SODIUM BLD-SCNC: 137 MMOL/L (ref 136–145)
SODIUM BLD-SCNC: 137 MMOL/L (ref 136–145)
SODIUM URINE: 35 MMOL/L
SPECIFIC GRAVITY UA: 1.01 (ref 1–1.03)
SPECIFIC GRAVITY, POC: 1.02
TOTAL CK: 188 U/L (ref 39–308)
TOTAL PROTEIN: 7 G/DL (ref 6.6–8.7)
TOTAL PROTEIN: 7.2 G/DL (ref 6.6–8.7)
TSH SERPL DL<=0.05 MIU/L-ACNC: 2.49 UIU/ML (ref 0.27–4.2)
UREA NITROGEN, UR: 545 MG/DL
URIC ACID, SERUM: 7.7 MG/DL (ref 3.4–7)
UROBILINOGEN, POC: 0.2
UROBILINOGEN, URINE: 0.2 E.U./DL
WBC # BLD: 7 K/UL (ref 4.8–10.8)
WBC # BLD: 8 K/UL (ref 4.8–10.8)
WBC UA: 12 /HPF (ref 0–5)

## 2022-11-23 PROCEDURE — 82570 ASSAY OF URINE CREATININE: CPT

## 2022-11-23 PROCEDURE — 99203 OFFICE O/P NEW LOW 30 MIN: CPT

## 2022-11-23 PROCEDURE — 81001 URINALYSIS AUTO W/SCOPE: CPT

## 2022-11-23 PROCEDURE — 86140 C-REACTIVE PROTEIN: CPT

## 2022-11-23 PROCEDURE — 83970 ASSAY OF PARATHORMONE: CPT

## 2022-11-23 PROCEDURE — 81002 URINALYSIS NONAUTO W/O SCOPE: CPT

## 2022-11-23 PROCEDURE — 83880 ASSAY OF NATRIURETIC PEPTIDE: CPT

## 2022-11-23 PROCEDURE — 82550 ASSAY OF CK (CPK): CPT

## 2022-11-23 PROCEDURE — 85379 FIBRIN DEGRADATION QUANT: CPT

## 2022-11-23 PROCEDURE — 84300 ASSAY OF URINE SODIUM: CPT

## 2022-11-23 PROCEDURE — 74019 RADEX ABDOMEN 2 VIEWS: CPT | Performed by: RADIOLOGY

## 2022-11-23 PROCEDURE — 74018 RADEX ABDOMEN 1 VIEW: CPT

## 2022-11-23 PROCEDURE — 99285 EMERGENCY DEPT VISIT HI MDM: CPT

## 2022-11-23 PROCEDURE — 84100 ASSAY OF PHOSPHORUS: CPT

## 2022-11-23 PROCEDURE — 51702 INSERT TEMP BLADDER CATH: CPT

## 2022-11-23 PROCEDURE — 84540 ASSAY OF URINE/UREA-N: CPT

## 2022-11-23 PROCEDURE — 87205 SMEAR GRAM STAIN: CPT

## 2022-11-23 PROCEDURE — 6360000002 HC RX W HCPCS: Performed by: NURSE PRACTITIONER

## 2022-11-23 PROCEDURE — 87086 URINE CULTURE/COLONY COUNT: CPT

## 2022-11-23 PROCEDURE — 2580000003 HC RX 258: Performed by: HOSPITALIST

## 2022-11-23 PROCEDURE — 36415 COLL VENOUS BLD VENIPUNCTURE: CPT

## 2022-11-23 PROCEDURE — 80053 COMPREHEN METABOLIC PANEL: CPT

## 2022-11-23 PROCEDURE — 6370000000 HC RX 637 (ALT 250 FOR IP): Performed by: NURSE PRACTITIONER

## 2022-11-23 PROCEDURE — 85025 COMPLETE CBC W/AUTO DIFF WBC: CPT

## 2022-11-23 PROCEDURE — 1123F ACP DISCUSS/DSCN MKR DOCD: CPT

## 2022-11-23 PROCEDURE — 82306 VITAMIN D 25 HYDROXY: CPT

## 2022-11-23 PROCEDURE — 84443 ASSAY THYROID STIM HORMONE: CPT

## 2022-11-23 PROCEDURE — 84550 ASSAY OF BLOOD/URIC ACID: CPT

## 2022-11-23 PROCEDURE — 2580000003 HC RX 258: Performed by: NURSE PRACTITIONER

## 2022-11-23 PROCEDURE — 1210000000 HC MED SURG R&B

## 2022-11-23 PROCEDURE — 93005 ELECTROCARDIOGRAM TRACING: CPT

## 2022-11-23 PROCEDURE — 6370000000 HC RX 637 (ALT 250 FOR IP): Performed by: HOSPITALIST

## 2022-11-23 PROCEDURE — 87635 SARS-COV-2 COVID-19 AMP PRB: CPT

## 2022-11-23 PROCEDURE — 83735 ASSAY OF MAGNESIUM: CPT

## 2022-11-23 RX ORDER — SODIUM CHLORIDE 0.9 % (FLUSH) 0.9 %
5-40 SYRINGE (ML) INJECTION PRN
Status: DISCONTINUED | OUTPATIENT
Start: 2022-11-23 | End: 2022-11-25 | Stop reason: HOSPADM

## 2022-11-23 RX ORDER — CLOPIDOGREL BISULFATE 75 MG/1
75 TABLET ORAL DAILY
Status: DISCONTINUED | OUTPATIENT
Start: 2022-11-24 | End: 2022-11-25 | Stop reason: HOSPADM

## 2022-11-23 RX ORDER — ENOXAPARIN SODIUM 100 MG/ML
30 INJECTION SUBCUTANEOUS DAILY
Status: DISCONTINUED | OUTPATIENT
Start: 2022-11-24 | End: 2022-11-23 | Stop reason: DRUGHIGH

## 2022-11-23 RX ORDER — SODIUM CHLORIDE 0.9 % (FLUSH) 0.9 %
5-40 SYRINGE (ML) INJECTION EVERY 12 HOURS SCHEDULED
Status: DISCONTINUED | OUTPATIENT
Start: 2022-11-23 | End: 2022-11-25 | Stop reason: HOSPADM

## 2022-11-23 RX ORDER — TAMSULOSIN HYDROCHLORIDE 0.4 MG/1
0.4 CAPSULE ORAL DAILY
Status: DISCONTINUED | OUTPATIENT
Start: 2022-11-23 | End: 2022-11-25 | Stop reason: HOSPADM

## 2022-11-23 RX ORDER — ONDANSETRON 2 MG/ML
4 INJECTION INTRAMUSCULAR; INTRAVENOUS EVERY 6 HOURS PRN
Status: DISCONTINUED | OUTPATIENT
Start: 2022-11-23 | End: 2022-11-25 | Stop reason: HOSPADM

## 2022-11-23 RX ORDER — AMLODIPINE BESYLATE 5 MG/1
5 TABLET ORAL DAILY
Status: DISCONTINUED | OUTPATIENT
Start: 2022-11-24 | End: 2022-11-25 | Stop reason: HOSPADM

## 2022-11-23 RX ORDER — METOPROLOL SUCCINATE 25 MG/1
25 TABLET, EXTENDED RELEASE ORAL DAILY
Status: DISCONTINUED | OUTPATIENT
Start: 2022-11-24 | End: 2022-11-25 | Stop reason: HOSPADM

## 2022-11-23 RX ORDER — ENOXAPARIN SODIUM 100 MG/ML
40 INJECTION SUBCUTANEOUS DAILY
Status: DISCONTINUED | OUTPATIENT
Start: 2022-11-23 | End: 2022-11-23

## 2022-11-23 RX ORDER — ONDANSETRON 4 MG/1
4 TABLET, ORALLY DISINTEGRATING ORAL EVERY 8 HOURS PRN
Status: DISCONTINUED | OUTPATIENT
Start: 2022-11-23 | End: 2022-11-25 | Stop reason: HOSPADM

## 2022-11-23 RX ORDER — ACETAMINOPHEN 325 MG/1
650 TABLET ORAL EVERY 6 HOURS PRN
Status: DISCONTINUED | OUTPATIENT
Start: 2022-11-23 | End: 2022-11-25 | Stop reason: HOSPADM

## 2022-11-23 RX ORDER — ACETAMINOPHEN 650 MG/1
650 SUPPOSITORY RECTAL EVERY 6 HOURS PRN
Status: DISCONTINUED | OUTPATIENT
Start: 2022-11-23 | End: 2022-11-25 | Stop reason: HOSPADM

## 2022-11-23 RX ORDER — ASPIRIN 81 MG/1
81 TABLET ORAL DAILY
Status: DISCONTINUED | OUTPATIENT
Start: 2022-11-24 | End: 2022-11-25 | Stop reason: HOSPADM

## 2022-11-23 RX ORDER — ENOXAPARIN SODIUM 100 MG/ML
40 INJECTION SUBCUTANEOUS DAILY
Status: DISCONTINUED | OUTPATIENT
Start: 2022-11-24 | End: 2022-11-24

## 2022-11-23 RX ORDER — SODIUM CHLORIDE 9 MG/ML
INJECTION, SOLUTION INTRAVENOUS PRN
Status: DISCONTINUED | OUTPATIENT
Start: 2022-11-23 | End: 2022-11-25 | Stop reason: HOSPADM

## 2022-11-23 RX ORDER — NITROGLYCERIN 0.4 MG/1
0.4 TABLET SUBLINGUAL EVERY 5 MIN PRN
Status: DISCONTINUED | OUTPATIENT
Start: 2022-11-23 | End: 2022-11-25 | Stop reason: HOSPADM

## 2022-11-23 RX ORDER — SODIUM CHLORIDE 9 MG/ML
INJECTION, SOLUTION INTRAVENOUS CONTINUOUS
Status: DISCONTINUED | OUTPATIENT
Start: 2022-11-23 | End: 2022-11-25 | Stop reason: HOSPADM

## 2022-11-23 RX ORDER — 0.9 % SODIUM CHLORIDE 0.9 %
1000 INTRAVENOUS SOLUTION INTRAVENOUS ONCE
Status: COMPLETED | OUTPATIENT
Start: 2022-11-23 | End: 2022-11-23

## 2022-11-23 RX ORDER — ATORVASTATIN CALCIUM 20 MG/1
40 TABLET, FILM COATED ORAL NIGHTLY
Status: DISCONTINUED | OUTPATIENT
Start: 2022-11-23 | End: 2022-11-25 | Stop reason: HOSPADM

## 2022-11-23 RX ORDER — HYDRALAZINE HYDROCHLORIDE 20 MG/ML
5 INJECTION INTRAMUSCULAR; INTRAVENOUS EVERY 4 HOURS PRN
Status: DISCONTINUED | OUTPATIENT
Start: 2022-11-23 | End: 2022-11-25 | Stop reason: HOSPADM

## 2022-11-23 RX ORDER — POLYETHYLENE GLYCOL 3350 17 G/17G
17 POWDER, FOR SOLUTION ORAL DAILY PRN
Status: DISCONTINUED | OUTPATIENT
Start: 2022-11-23 | End: 2022-11-24

## 2022-11-23 RX ADMIN — SODIUM CHLORIDE, PRESERVATIVE FREE 1000 MG: 5 INJECTION INTRAVENOUS at 22:13

## 2022-11-23 RX ADMIN — SODIUM CHLORIDE: 9 INJECTION, SOLUTION INTRAVENOUS at 22:11

## 2022-11-23 RX ADMIN — SODIUM CHLORIDE 1000 ML: 9 INJECTION, SOLUTION INTRAVENOUS at 19:43

## 2022-11-23 RX ADMIN — TAMSULOSIN HYDROCHLORIDE 0.4 MG: 0.4 CAPSULE ORAL at 22:13

## 2022-11-23 RX ADMIN — ATORVASTATIN CALCIUM 40 MG: 20 TABLET, FILM COATED ORAL at 22:13

## 2022-11-23 ASSESSMENT — ENCOUNTER SYMPTOMS
ABDOMINAL PAIN: 1
BACK PAIN: 1

## 2022-11-23 ASSESSMENT — PAIN DESCRIPTION - ORIENTATION: ORIENTATION: LEFT;RIGHT

## 2022-11-23 ASSESSMENT — PAIN DESCRIPTION - PAIN TYPE: TYPE: ACUTE PAIN

## 2022-11-23 ASSESSMENT — PAIN DESCRIPTION - FREQUENCY: FREQUENCY: INTERMITTENT

## 2022-11-23 ASSESSMENT — PAIN DESCRIPTION - DESCRIPTORS: DESCRIPTORS: ACHING

## 2022-11-23 ASSESSMENT — PAIN DESCRIPTION - LOCATION: LOCATION: FLANK

## 2022-11-23 ASSESSMENT — PAIN - FUNCTIONAL ASSESSMENT: PAIN_FUNCTIONAL_ASSESSMENT: 0-10

## 2022-11-23 NOTE — PROGRESS NOTES
[address]  [address] [place] [address]  Dept: [phone]  Dept Fax: [phone]: [phone]    Jamison Gilmore Son is a 78 y.o. male who presents today for his medical conditions/complaints as noted below. Nathaniel Rank is c/o of Urinary Frequency        HPI:     HPI  Jamison Andersen presents with complaints of dysuria and frequency. Symptoms began  night after being discharged from the hospital s/p stent x 3 vessels. No OTC treatment. Denies recent antibiotics and steroids. Denies recent covid19 infection. Vaccinated against [id number].      Past Medical History:   Diagnosis Date    GERD (gastroesophageal reflux disease)     History of tobacco abuse as teen ager      Past Surgical History:   Procedure Laterality Date    FOOT SURGERY Right     for a heel fracture       Family History   Problem Relation Age of Onset    Other Mother          of complications of neck surgery when the patient was 11years old    No Known Problems Father         Edyta Valentine seen him 6-8 times in my whole life\"    No Known Problems Sister     No Known Problems Maternal Grandmother     Stroke Maternal Grandfather         at age 61 had a CVA and  within 1-2 years of that    Other Son          in a MVA    No Known Problems Other     No Known Problems Other        Social History     Tobacco Use    Smoking status: Former     Packs/day: 0.25     Years: 2.00     Pack years: 0.50     Types: Cigarettes     Quit date: 1954     Years since quittin.0    Smokeless tobacco: Never    Tobacco comments:     As a teen ager   Substance Use Topics    Alcohol use: Not Currently     Comment: when he was younger socially but not heavily      Current Outpatient Medications   Medication Sig Dispense Refill    aspirin 81 MG EC tablet Take 1 tablet by mouth daily 30 tablet 1    nitroGLYCERIN (NITROSTAT) 0.4 MG SL tablet Place 1 tablet under the tongue every 5 minutes as needed for Chest pain up to max of 3 total doses. If no relief after 1 dose, call 911. 25 tablet 1    atorvastatin (LIPITOR) 80 MG tablet Take 1 tablet by mouth nightly 30 tablet 1    valsartan (DIOVAN) 80 MG tablet Take 1 tablet by mouth 2 times daily 60 tablet 1    metoprolol succinate (TOPROL XL) 25 MG extended release tablet Take 1 tablet by mouth daily 30 tablet 1    clopidogrel (PLAVIX) 75 MG tablet Take 1 tablet by mouth daily 30 tablet 1    esomeprazole Magnesium (NEXIUM) 20 MG PACK Take 20 mg by mouth as needed       No current facility-administered medications for this visit. No Known Allergies    Health Maintenance   Topic Date Due    Depression Screen  Never done    Hepatitis C screen  Never done    DTaP/Tdap/Td vaccine (1 - Tdap) Never done    Colorectal Cancer Screen  Never done    Shingles vaccine (1 of 2) Never done    Pneumococcal 65+ years Vaccine (1 - PCV) Never done    AAA screen  Never done    Annual Wellness Visit (AWV)  Never done    COVID-19 Vaccine (3 - Booster for Pfizer series) 08/06/2021    Flu vaccine (1) Never done    Lipids  11/19/2023    Hepatitis A vaccine  Aged Out    Hib vaccine  Aged Out    Meningococcal (ACWY) vaccine  Aged Out       Subjective:     Review of Systems   Constitutional:  Negative for fever. Genitourinary:  Positive for dysuria, flank pain (mild) and frequency.     :Objective      Physical Exam  Constitutional:       General: He is not in acute distress. Appearance: Normal appearance. He is not ill-appearing or toxic-appearing. HENT:      Head: Normocephalic and atraumatic. Right Ear: External ear normal.      Left Ear: External ear normal.      Nose: Nose normal.      Mouth/Throat:      Mouth: Mucous membranes are moist.   Eyes:      General:         Right eye: No discharge. Left eye: No discharge. Conjunctiva/sclera: Conjunctivae normal.   Cardiovascular:      Rate and Rhythm: Normal rate and regular rhythm. Pulmonary:      Effort: Pulmonary effort is normal. No respiratory distress. Abdominal:      General: Abdomen is flat. Palpations: Abdomen is soft. Tenderness: There is no right CVA tenderness or left CVA tenderness. Musculoskeletal:         General: Normal range of motion. Cervical back: Normal range of motion. Lymphadenopathy:      Cervical: No cervical adenopathy. Skin:     General: Skin is warm and dry. Capillary Refill: Capillary refill takes less than 2 seconds. Findings: No rash. Neurological:      General: No focal deficit present. Mental Status: He is alert. Psychiatric:         Mood and Affect: Mood normal.     /70   Pulse 60   Temp 98.6 °F (37 °C)   Resp 16   Ht 6' (1.829 m)   Wt 203 lb (92.1 kg)   SpO2 96%   BMI 27.53 kg/m²     :Assessment       Diagnosis Orders   1. Urinary frequency  POCT Urinalysis no Micro    Comprehensive Metabolic Panel    Culture, Urine    CBC with Auto Differential          :Plan   Reports mild flank pain but no CVA tenderness on exam. Microscopic hematuria, no leukocytes in urine. Will culture as symptoms suggestive of UTI. Patient concerned about kidney function, SMP to assess renal function and CBC to assess WBC count. Supportive care at home and f/u with PCP. Return precautions and home care education completed. Patient verbalized understanding. Orders Placed This Encounter   Procedures    Culture, Urine     Order Specific Question:   Specify (ex-cath, midstream, cysto, etc)?      Answer:   midstream    Comprehensive Metabolic Panel    CBC with Auto Differential    POCT Urinalysis no Micro     Results for orders placed or performed in visit on 11/23/22   POCT Urinalysis no Micro   Result Value Ref Range    Color, UA yellow     Clarity, UA clear     Glucose, UA POC neg     Bilirubin, UA neg     Ketones, UA neg     Spec Grav, UA 1.020     Blood, UA POC small (A)     pH, UA 5.5     Protein, UA POC neg     Urobilinogen, UA 0.2     Leukocytes, UA neg     Nitrite, UA neg     Appearance, Fluid Slightly Cloudy Clear, Slightly Cloudy       No follow-ups on file. No orders of the defined types were placed in this encounter. Patient given educational materials- see patient instructions. Discussed use, benefit, and side effects of prescribed medications. All patient questions answered. Pt voiced understanding. Patient Instructions   Labs and urine results will be called to you once available.    Continue to drink lots of water  Avoid azo and other medications for burning with urination      Electronically signed by DOUGIE Daniels CNP on 11/23/2022 at 1:43 PM

## 2022-11-23 NOTE — ED TRIAGE NOTES
Pt states he was seen today at [facility] for same sx and told his urine \"didn't look that bad\" but they called him and said he needed to be seen bc \"his blood work was elevated\". Pt unsure of what was elevated.

## 2022-11-23 NOTE — PATIENT INSTRUCTIONS
Labs and urine results will be called to you once available.    Continue to drink lots of water  Avoid azo and other medications for burning with urination

## 2022-11-23 NOTE — TELEPHONE ENCOUNTER
Pt. Had stent placement on 11/19. Jana Atkinson he was told procedure was hard on kidneys. Pt is going very little at a time multiple times a day. Said he'll go 3 times, a little each time, to full have gone, and starts again. Said he didn't have this problem prior to the procedure.

## 2022-11-24 ENCOUNTER — APPOINTMENT (OUTPATIENT)
Dept: CT IMAGING | Age: 77
DRG: 682 | End: 2022-11-24
Payer: MEDICARE

## 2022-11-24 LAB
ANION GAP SERPL CALCULATED.3IONS-SCNC: 4 MMOL/L (ref 7–19)
BUN BLDV-MCNC: 30 MG/DL (ref 8–23)
CALCIUM SERPL-MCNC: 8 MG/DL (ref 8.8–10.2)
CHLORIDE BLD-SCNC: 109 MMOL/L (ref 98–111)
CO2: 28 MMOL/L (ref 22–29)
CREAT SERPL-MCNC: 1.5 MG/DL (ref 0.5–1.2)
FERRITIN: 194.8 NG/ML (ref 30–400)
FOLATE: 12.3 NG/ML (ref 4.5–32.2)
GFR SERPL CREATININE-BSD FRML MDRD: 48 ML/MIN/{1.73_M2}
GLUCOSE BLD-MCNC: 102 MG/DL (ref 74–109)
HCT VFR BLD CALC: 38.8 % (ref 42–52)
HEMOGLOBIN: 12.7 G/DL (ref 14–18)
IRON SATURATION: 13 % (ref 14–50)
IRON: 33 UG/DL (ref 59–158)
MCH RBC QN AUTO: 29.1 PG (ref 27–31)
MCHC RBC AUTO-ENTMCNC: 32.7 G/DL (ref 33–37)
MCV RBC AUTO: 89 FL (ref 80–94)
PDW BLD-RTO: 12.5 % (ref 11.5–14.5)
PLATELET # BLD: 147 K/UL (ref 130–400)
PMV BLD AUTO: 9.5 FL (ref 9.4–12.4)
POTASSIUM SERPL-SCNC: 4 MMOL/L (ref 3.5–5)
RBC # BLD: 4.36 M/UL (ref 4.7–6.1)
SODIUM BLD-SCNC: 141 MMOL/L (ref 136–145)
TOTAL IRON BINDING CAPACITY: 263 UG/DL (ref 250–400)
VITAMIN B-12: 168 PG/ML (ref 211–946)
VITAMIN D 25-HYDROXY: 17 NG/ML
WBC # BLD: 5.8 K/UL (ref 4.8–10.8)

## 2022-11-24 PROCEDURE — 6360000002 HC RX W HCPCS: Performed by: HOSPITALIST

## 2022-11-24 PROCEDURE — 51702 INSERT TEMP BLADDER CATH: CPT

## 2022-11-24 PROCEDURE — 82746 ASSAY OF FOLIC ACID SERUM: CPT

## 2022-11-24 PROCEDURE — 6370000000 HC RX 637 (ALT 250 FOR IP): Performed by: STUDENT IN AN ORGANIZED HEALTH CARE EDUCATION/TRAINING PROGRAM

## 2022-11-24 PROCEDURE — 82607 VITAMIN B-12: CPT

## 2022-11-24 PROCEDURE — 2580000003 HC RX 258: Performed by: STUDENT IN AN ORGANIZED HEALTH CARE EDUCATION/TRAINING PROGRAM

## 2022-11-24 PROCEDURE — 83540 ASSAY OF IRON: CPT

## 2022-11-24 PROCEDURE — 80048 BASIC METABOLIC PNL TOTAL CA: CPT

## 2022-11-24 PROCEDURE — 74176 CT ABD & PELVIS W/O CONTRAST: CPT | Performed by: RADIOLOGY

## 2022-11-24 PROCEDURE — 2580000003 HC RX 258: Performed by: NURSE PRACTITIONER

## 2022-11-24 PROCEDURE — 1210000000 HC MED SURG R&B

## 2022-11-24 PROCEDURE — 82728 ASSAY OF FERRITIN: CPT

## 2022-11-24 PROCEDURE — 74150 CT ABDOMEN W/O CONTRAST: CPT

## 2022-11-24 PROCEDURE — 36415 COLL VENOUS BLD VENIPUNCTURE: CPT

## 2022-11-24 PROCEDURE — 6370000000 HC RX 637 (ALT 250 FOR IP): Performed by: NURSE PRACTITIONER

## 2022-11-24 PROCEDURE — 85027 COMPLETE CBC AUTOMATED: CPT

## 2022-11-24 PROCEDURE — 6370000000 HC RX 637 (ALT 250 FOR IP): Performed by: HOSPITALIST

## 2022-11-24 PROCEDURE — 83550 IRON BINDING TEST: CPT

## 2022-11-24 RX ORDER — FERROUS SULFATE 325(65) MG
325 TABLET ORAL 2 TIMES DAILY WITH MEALS
Status: DISCONTINUED | OUTPATIENT
Start: 2022-11-24 | End: 2022-11-25 | Stop reason: HOSPADM

## 2022-11-24 RX ORDER — ENOXAPARIN SODIUM 100 MG/ML
30 INJECTION SUBCUTANEOUS DAILY
Status: DISCONTINUED | OUTPATIENT
Start: 2022-11-24 | End: 2022-11-24 | Stop reason: DRUGHIGH

## 2022-11-24 RX ORDER — TRAZODONE HYDROCHLORIDE 50 MG/1
50 TABLET ORAL NIGHTLY PRN
Status: DISCONTINUED | OUTPATIENT
Start: 2022-11-24 | End: 2022-11-25 | Stop reason: HOSPADM

## 2022-11-24 RX ORDER — CYANOCOBALAMIN 1000 UG/ML
1000 INJECTION, SOLUTION INTRAMUSCULAR; SUBCUTANEOUS DAILY
Status: DISCONTINUED | OUTPATIENT
Start: 2022-11-25 | End: 2022-11-25 | Stop reason: HOSPADM

## 2022-11-24 RX ORDER — POLYETHYLENE GLYCOL 3350 17 G/17G
17 POWDER, FOR SOLUTION ORAL DAILY
Status: DISCONTINUED | OUTPATIENT
Start: 2022-11-24 | End: 2022-11-25 | Stop reason: HOSPADM

## 2022-11-24 RX ORDER — ERGOCALCIFEROL 1.25 MG/1
50000 CAPSULE ORAL WEEKLY
Status: DISCONTINUED | OUTPATIENT
Start: 2022-11-24 | End: 2022-11-24

## 2022-11-24 RX ORDER — CYANOCOBALAMIN 1000 UG/ML
1000 INJECTION, SOLUTION INTRAMUSCULAR; SUBCUTANEOUS ONCE
Status: COMPLETED | OUTPATIENT
Start: 2022-11-24 | End: 2022-11-24

## 2022-11-24 RX ORDER — ALLOPURINOL 100 MG/1
100 TABLET ORAL DAILY
Status: DISCONTINUED | OUTPATIENT
Start: 2022-11-24 | End: 2022-11-25 | Stop reason: HOSPADM

## 2022-11-24 RX ORDER — MECOBALAMIN 5000 MCG
5 TABLET,DISINTEGRATING ORAL NIGHTLY
Status: DISCONTINUED | OUTPATIENT
Start: 2022-11-24 | End: 2022-11-25 | Stop reason: HOSPADM

## 2022-11-24 RX ORDER — SENNA AND DOCUSATE SODIUM 50; 8.6 MG/1; MG/1
2 TABLET, FILM COATED ORAL DAILY
Status: DISCONTINUED | OUTPATIENT
Start: 2022-11-24 | End: 2022-11-25 | Stop reason: HOSPADM

## 2022-11-24 RX ORDER — ERGOCALCIFEROL 1.25 MG/1
50000 CAPSULE ORAL WEEKLY
Status: DISCONTINUED | OUTPATIENT
Start: 2022-11-25 | End: 2022-11-25 | Stop reason: HOSPADM

## 2022-11-24 RX ORDER — ENOXAPARIN SODIUM 100 MG/ML
40 INJECTION SUBCUTANEOUS DAILY
Status: DISCONTINUED | OUTPATIENT
Start: 2022-11-24 | End: 2022-11-25 | Stop reason: HOSPADM

## 2022-11-24 RX ADMIN — FERROUS SULFATE TAB 325 MG (65 MG ELEMENTAL FE) 325 MG: 325 (65 FE) TAB at 20:52

## 2022-11-24 RX ADMIN — ERGOCALCIFEROL 50000 UNITS: 1.25 CAPSULE ORAL at 08:42

## 2022-11-24 RX ADMIN — SODIUM CHLORIDE: 9 INJECTION, SOLUTION INTRAVENOUS at 20:50

## 2022-11-24 RX ADMIN — ATORVASTATIN CALCIUM 40 MG: 20 TABLET, FILM COATED ORAL at 20:52

## 2022-11-24 RX ADMIN — CYANOCOBALAMIN 1000 MCG: 1000 INJECTION, SOLUTION INTRAMUSCULAR; SUBCUTANEOUS at 20:52

## 2022-11-24 RX ADMIN — Medication 5 MG: at 20:52

## 2022-11-24 RX ADMIN — SENNOSIDES AND DOCUSATE SODIUM 2 TABLET: 50; 8.6 TABLET ORAL at 08:42

## 2022-11-24 RX ADMIN — TAMSULOSIN HYDROCHLORIDE 0.4 MG: 0.4 CAPSULE ORAL at 08:43

## 2022-11-24 RX ADMIN — AMLODIPINE BESYLATE 5 MG: 5 TABLET ORAL at 08:43

## 2022-11-24 RX ADMIN — POLYETHYLENE GLYCOL 3350 17 G: 17 POWDER, FOR SOLUTION ORAL at 09:23

## 2022-11-24 RX ADMIN — CLOPIDOGREL BISULFATE 75 MG: 75 TABLET ORAL at 08:42

## 2022-11-24 RX ADMIN — ENOXAPARIN SODIUM 40 MG: 100 INJECTION SUBCUTANEOUS at 20:51

## 2022-11-24 RX ADMIN — METOPROLOL SUCCINATE 25 MG: 25 TABLET, EXTENDED RELEASE ORAL at 08:42

## 2022-11-24 RX ADMIN — SODIUM CHLORIDE, PRESERVATIVE FREE 10 ML: 5 INJECTION INTRAVENOUS at 20:52

## 2022-11-24 RX ADMIN — ASPIRIN 81 MG: 81 TABLET, COATED ORAL at 08:42

## 2022-11-24 RX ADMIN — ALLOPURINOL 100 MG: 100 TABLET ORAL at 08:43

## 2022-11-24 NOTE — PROGRESS NOTES
Automatic Dose Adjustment of                Subcutaneous Anticoagulant for Prophylaxis    Mary Laureano is a 78 y.o. male. Recent Labs     11/23/22  1241 11/23/22  1820   CREATININE 2.2* 1.9*       Estimated Creatinine Clearance: 37 mL/min (A) (based on SCr of 1.9 mg/dL (H)). Weight:  Wt Readings from Last 1 Encounters:   11/23/22 203 lb 11.2 oz (92.4 kg)           Pharmacy has adjusted subcutaneous anticoagulant for prophylaxis to Enoxaparin 40 mg SC daily based on the patient's weight and estimated CrCl per [facility] policy.                Electronically signed by Ana Rosa Lizarraga, [facility] on 11/24/2022 at 1:26 AM

## 2022-11-24 NOTE — ED PROVIDER NOTES
[facility] 4 ONCOLOGY UNIT  eMERGENCY dEPARTMENT eNCOUnter      Pt Name: Kaya Espinal  MRN: [id number]  [place] 1945  Date of evaluation: 11/23/2022  Provider: DOUGIE Rao    CHIEF COMPLAINT       Chief Complaint   Patient presents with    Urinary Frequency     Pt states increased frequency starting Saturday. Pt reports he got cardiac stents Saturday, was sent home, and has been urinating frequently ever since. Pt also c/o burning with urination. Dysuria         HISTORY OF PRESENT ILLNESS   (Location/Symptom, Timing/Onset,Context/Setting, Quality, Duration, Modifying Factors, Severity)  Note limiting factors. Kaya Espinal is a 78 y.o. male who presents to the emergency department  with dysuria x4 days. Was admitted and had cardiac stents 4 days ago. Went to urgent care and was sent over here    The history is provided by the patient. Dysuria   This is a new problem. The current episode started more than 2 days ago. The problem occurs every urination. The problem has not changed since onset. Pertinent negatives include no hematuria. NursingNotes were reviewed. REVIEW OF SYSTEMS    (2-9 systems for level 4, 10 or more for level 5)     Review of Systems   Genitourinary:  Positive for dysuria. Negative for hematuria. Except as noted above the remainder of the review of systems was reviewed and negative.        PAST MEDICAL HISTORY     Past Medical History:   Diagnosis Date    GERD (gastroesophageal reflux disease)     History of tobacco abuse as teen ager     Myocardial infarction ([facility] [address]          SURGICALHISTORY       Past Surgical History:   Procedure Laterality Date    CORONARY ANGIOPLASTY WITH STENT PLACEMENT  11/19/2022    x3    FOOT SURGERY Right 1999    for a heel fracture         CURRENT MEDICATIONS       Current Discharge Medication List        CONTINUE these medications which have NOT CHANGED    Details   aspirin 81 MG EC tablet Take 1 tablet by mouth daily  Qty: 30 tablet, Refills: 1      nitroGLYCERIN (NITROSTAT) 0.4 MG SL tablet Place 1 tablet under the tongue every 5 minutes as needed for Chest pain up to max of 3 total doses. If no relief after 1 dose, call 911. Qty: 25 tablet, Refills: 1      atorvastatin (LIPITOR) 80 MG tablet Take 1 tablet by mouth nightly  Qty: 30 tablet, Refills: 1      valsartan (DIOVAN) 80 MG tablet Take 1 tablet by mouth 2 times daily  Qty: 60 tablet, Refills: 1      metoprolol succinate (TOPROL XL) 25 MG extended release tablet Take 1 tablet by mouth daily  Qty: 30 tablet, Refills: 1      clopidogrel (PLAVIX) 75 MG tablet Take 1 tablet by mouth daily  Qty: 30 tablet, Refills: 1      esomeprazole Magnesium (NEXIUM) 20 MG PACK Take 20 mg by mouth as needed             ALLERGIES     Patient has no known allergies.     FAMILY HISTORY       Family History   Problem Relation Age of Onset    Other Mother          of complications of neck surgery when the patient was 11years old    No Known Problems Father         Edyta Valentine seen him 6-8 times in my whole life\"    No Known Problems Sister     No Known Problems Maternal Grandmother     Stroke Maternal Grandfather         at age 61 had a CVA and  within 1-2 years of that    Other Son          in a MVA    No Known Problems Other     No Known Problems Other           SOCIAL HISTORY       Social History     Socioeconomic History    Marital status:      Spouse name: None    Number of children: 1    Years of education: None    Highest education level: None   Occupational History    Occupation:      Comment: still working   Tobacco Use    Smoking status: Former     Packs/day: 0.25     Years: 2.00     Pack years: 0.50     Types: Cigarettes     Quit date: 1954     Years since quittin.0    Smokeless tobacco: Never    Tobacco comments:     As a teen ager   Vaping Use    Vaping Use: Never used   Substance and Sexual Activity    Alcohol use: Not Currently     Comment: when he was younger socially but not heavily    Drug use: Never   Social History Narrative    CODE STATUS: Full Code    HEALTH CARE PROXY: his grandson, Mr. Elex Schirmer, [phone]    AMBULATES: independently    DOMICILED: has no stairs in his home but does at work, lives alone, his grand sons live nearby and he sees them regularly, he has 2 dogs that live outside       [facility] [address] Opening: Spontaneous  Best Verbal Response: Oriented  Best Motor Response: Obeys commands  Radha Coma Scale Score: 15 @FLOW([id number])@      PHYSICAL EXAM    (up to 7 for level 4, 8 or more for level 5)     ED Triage Vitals [11/23/22 1649]   BP Temp Temp Source Heart Rate Resp SpO2 Height Weight   (!) 148/74 98.5 °F (36.9 °C) Temporal 74 16 95 % 6' (1.829 m) 203 lb 11.2 oz (92.4 kg)       Physical Exam  Vitals and nursing note reviewed. Constitutional:       Appearance: Normal appearance. He is well-developed. HENT:      Head: Normocephalic and atraumatic. Eyes:      General: No scleral icterus. Right eye: No discharge. Left eye: No discharge. Cardiovascular:      Rate and Rhythm: Normal rate and regular rhythm. Heart sounds: Normal heart sounds. Pulmonary:      Effort: No respiratory distress. Breath sounds: Normal breath sounds. Abdominal:      General: Abdomen is flat. There is no distension. Palpations: Abdomen is soft. Tenderness: There is abdominal tenderness in the suprapubic area. There is no guarding or rebound. Musculoskeletal:      Cervical back: Normal range of motion and neck supple. Neurological:      Mental Status: He is alert and oriented to person, place, and time.    Psychiatric:         Behavior: Behavior normal.       DIAGNOSTIC RESULTS     EKG: All EKG's are interpreted by the Emergency Department Physician who either signs or Co-signsthis chart in the absence of a cardiologist.  62 sinus rhythm with right bundle branch block nonspecific changes read at 2055 by Dr. Edwin Wallace:   Leala Pries such as CT, Ultrasound and MRI are read by the radiologist. Plain radiographic images are visualized and preliminarily interpreted by the emergency physician with the below findings:      Interpretation per the Radiologist below, if available at the time of this note:    XR ABDOMEN (KUB) (SINGLE AP VIEW)   Final Result   Mild ileus   Recommendation:    Follow up as clinically indicated.    Electronically Signed by Amanda Esqueda MD at 23-Nov-2022 10:54:55 PM EST               US RENAL COMPLETE    (Results Pending)   NM LUNG VENT/PERFUSION (VQ)    (Results Pending)         ED BEDSIDEULTRASOUND:   Performed by ED Physician -none    LABS:  Labs Reviewed   COVID-19, RAPID - Abnormal; Notable for the following components:       Result Value    SARS-CoV-2, NAAT DETECTED (*)     All other components within normal limits   URINALYSIS WITH REFLEX TO CULTURE - Abnormal; Notable for the following components:    Blood, Urine MODERATE (*)     All other components within normal limits   CBC WITH AUTO DIFFERENTIAL - Abnormal; Notable for the following components:    Hematocrit 41.9 (*)     Neutrophils % 67.9 (*)     Lymphocytes % 15.0 (*)     Eosinophils % 7.9 (*)     All other components within normal limits   COMPREHENSIVE METABOLIC PANEL W/ REFLEX TO MG FOR LOW K - Abnormal; Notable for the following components:    Glucose 138 (*)     BUN 38 (*)     Creatinine 1.9 (*)     Est, Glom Filt Rate 36 (*)     Calcium 8.6 (*)     All other components within normal limits   MICROSCOPIC URINALYSIS - Abnormal; Notable for the following components:    Bacteria, UA NEGATIVE (*)     Crystals, UA NEG (*)     Hyaline Casts, UA 15 (*)     WBC, UA 12 (*)     RBC, UA 6 (*)     All other components within normal limits   PTH, INTACT - Abnormal; Notable for the following components:    PTH 84.0 (*)     All other components within normal limits   MAGNESIUM - Abnormal; Notable for the following components:    Magnesium 3.0 (*)     All other components within normal limits   URIC ACID - Abnormal; Notable for the following components:    Uric Acid, Serum 7.7 (*)     All other components within normal limits   D-DIMER, QUANTITATIVE - Abnormal; Notable for the following components:    D-Dimer, Quant 1.70 (*)     All other components within normal limits   C-REACTIVE PROTEIN - Abnormal; Notable for the following components:    CRP 3.38 (*)     All other components within normal limits   CULTURE, URINE   BRAIN NATRIURETIC PEPTIDE   SODIUM, URINE, RANDOM   UREA NITROGEN, URINE   PHOSPHORUS   EOSINOPHIL SMEAR, URINE   CREATININE, RANDOM URINE   TSH   CK   CBC   VITAMIN D 25 HYDROXY   BASIC METABOLIC PANEL       All other labs were within normal range or not returned as of this dictation. EMERGENCY DEPARTMENT COURSE and DIFFERENTIALDIAGNOSIS/MDM:   Vitals:    Vitals:    11/23/22 1649 11/23/22 2115 11/23/22 2207   BP: (!) 148/74 (!) 151/90 (!) 145/71   Pulse: 74 68 69   Resp: 16 18 17   Temp: 98.5 °F (36.9 °C) 97.7 °F (36.5 °C) 97.6 °F (36.4 °C)   TempSrc: Temporal Oral    SpO2: 95% 96% 99%   Weight: 203 lb 11.2 oz (92.4 kg)     Height: 6' (1.829 m)             MDM  Spoke to [facility] for admission      CONSULTS:  None    PROCEDURES:  Unless otherwise noted below, none     Procedures    FINAL IMPRESSION      1. BERTHA (acute kidney injury) ([facility] [address]    2. COVID-19    3. Urinary retention        DISPOSITION/PLAN   DISPOSITION Admitted 11/23/2022 07:43:05 PM      PATIENT REFERRED TO:  No follow-up provider specified.     DISCHARGE MEDICATIONS:  Current Discharge Medication List             (Please note that portions of this note were completed with a voice recognitionprogram.  Efforts were made to edit the dictations but occasionally words are mis-transcribed.)    DOUGIE Lai (electronically signed)          DOUGIE Lai  11/24/22 [address], APRJAUN  11/24/22 0142

## 2022-11-24 NOTE — PROGRESS NOTES
Daily Progress Note    Date:2022  Patient: Gary Jackson  : 1945  TXP:[id number]  CODE:Full Code No additional code details  PCP:No primary care provider on file. Admit Date: 2022  5:00 PM   LOS: 1 day       Subjective:   He present with some genitourinary symptoms including suprapubic discomfort and back pain, however symptoms have improved some, still with some suprapubic discomfort, Mosley in place. No fevers or chills overnight. Denies flank pain. Summary: 26-year-old male recently hospitalized with chest pain and found to have obstructive CAD s/p PCI with stents to OM1, mid LAD and first diagonal, discharged on dual antiplatelet therapy, presenting back with a few days of genitourinary symptoms and back pain, found to have acute kidney injury, urinalysis without evidence of infection however some hematuria. Managed with Mosley placement and IV fluids. CT kidney/urinary tract protocol obtained. Incidentally found to be COVID-positive however no significant symptomatology, fevers or hypoxia.       Review of Systems  14 point review of systems completed and is negative except as otherwise noted      Objective:      Vital signs in last 24 hours:  Patient Vitals for the past 24 hrs:   BP Temp Temp src Pulse Resp SpO2 Height Weight   22 0745 119/69 98.2 °F (36.8 °C) Temporal 63 18 95 % -- --   22 0336 129/63 98 °F (36.7 °C) -- 76 14 98 % -- --   22 2207 (!) 145/71 97.6 °F (36.4 °C) -- 69 17 99 % -- --   22 2115 (!) 151/90 97.7 °F (36.5 °C) Oral 68 18 96 % -- --   22 1649 (!) 148/74 98.5 °F (36.9 °C) Temporal 74 16 95 % 6' (1.829 m) 203 lb 11.2 oz (92.4 kg)     Physical exam    General: No acute distress  Psych: Calm and cooperative  HEENT: NC/AT, normal sclera  Cardiovascular: Normal rate, regular rhythm, S1-S2 present  Respiratory: No wheezes rales or rhonchi, normal effort  Abdomen: Soft, mild suprapubic tenderness without guarding or rebound, bowel sounds present  Musculoskeletal: No flank or CVA tenderness  Neurologic: Alert, follows commands, nonfocal  Extremities: No clubbing cyanosis or edema  Skin: Warm and dry        Lab Review   Recent Results (from the past 24 hour(s))   Comprehensive Metabolic Panel    Collection Time: 11/23/22 12:41 PM   Result Value Ref Range    Sodium 137 136 - 145 mmol/L    Potassium 4.1 3.5 - 5.0 mmol/L    Chloride 99 98 - 111 mmol/L    CO2 26 22 - 29 mmol/L    Anion Gap 12 7 - 19 mmol/L    Glucose 97 74 - 109 mg/dL    BUN 41 (H) 8 - 23 mg/dL    Creatinine 2.2 (H) 0.5 - 1.2 mg/dL    Est, Glom Filt Rate 30 (A) >60    Calcium 8.9 8.8 - 10.2 mg/dL    Total Protein 7.2 6.6 - 8.7 g/dL    Albumin 4.2 3.5 - 5.2 g/dL    Total Bilirubin 0.9 0.2 - 1.2 mg/dL    Alkaline Phosphatase 97 40 - 130 U/L    ALT 20 5 - 41 U/L    AST 21 5 - 40 U/L   Culture, Urine    Collection Time: 11/23/22 12:41 PM    Specimen: Urine, clean catch   Result Value Ref Range    Urine Culture, Routine No growth    CBC with Auto Differential    Collection Time: 11/23/22 12:41 PM   Result Value Ref Range    WBC 8.0 4.8 - 10.8 K/uL    RBC 4.90 4.70 - 6.10 M/uL    Hemoglobin 14.2 14.0 - 18.0 g/dL    Hematocrit 43.6 42.0 - 52.0 %    MCV 89.0 80.0 - 94.0 fL    MCH 29.0 27.0 - 31.0 pg    MCHC 32.6 (L) 33.0 - 37.0 g/dL    RDW 12.8 11.5 - 14.5 %    Platelets 202 358 - 707 K/uL    MPV 9.6 9.4 - 12.4 fL    Neutrophils % 69.7 (H) 50.0 - 65.0 %    Lymphocytes % 14.2 (L) 20.0 - 40.0 %    Monocytes % 8.2 0.0 - 10.0 %    Eosinophils % 6.8 (H) 0.0 - 5.0 %    Basophils % 0.7 0.0 - 1.0 %    Neutrophils Absolute 5.6 1.5 - 7.5 K/uL    Immature Granulocytes # 0.0 K/uL    Lymphocytes Absolute 1.1 1.1 - 4.5 K/uL    Monocytes Absolute 0.70 0.00 - 0.90 K/uL    Eosinophils Absolute 0.60 0.00 - 0.60 K/uL    Basophils Absolute 0.10 0.00 - 0.20 K/uL   POCT Urinalysis no Micro    Collection Time: 11/23/22  1:25 PM   Result Value Ref Range    Color, UA yellow     Clarity, UA clear     Glucose, UA POC neg Bilirubin, UA neg     Ketones, UA neg     Spec Grav, UA 1.020     Blood, UA POC small (A)     pH, UA 5.5     Protein, UA POC neg     Urobilinogen, UA 0.2     Leukocytes, UA neg     Nitrite, UA neg     Appearance, Fluid Slightly Cloudy Clear, Slightly Cloudy   Urinalysis with Reflex to Culture    Collection Time: 11/23/22  5:30 PM    Specimen: Urine   Result Value Ref Range    Color, UA YELLOW Straw/Yellow    Clarity, UA Clear Clear    Glucose, Ur Negative Negative mg/dL    Bilirubin Urine Negative Negative    Ketones, Urine Negative Negative mg/dL    Specific Gravity, UA 1.014 1.005 - 1.030    Blood, Urine MODERATE (A) Negative    pH, UA 5.0 5.0 - 8.0    Protein, UA Negative Negative mg/dL    Urobilinogen, Urine 0.2 <2.0 E.U./dL    Nitrite, Urine Negative Negative    Leukocyte Esterase, Urine Negative Negative   Microscopic Urinalysis    Collection Time: 11/23/22  5:30 PM   Result Value Ref Range    Bacteria, UA NEGATIVE (A) None Seen /HPF    Crystals, UA NEG (A) None Seen /HPF    Hyaline Casts, UA 15 (H) 0 - 8 /HPF    WBC, UA 12 (H) 0 - 5 /HPF    RBC, UA 6 (H) 0 - 4 /HPF    Epithelial Cells, UA 3 0 - 5 /HPF   Culture, Urine    Collection Time: 11/23/22  5:30 PM    Specimen: Urine, clean catch   Result Value Ref Range    Urine Culture, Routine No growth    Sodium, Urine, Random    Collection Time: 11/23/22  5:30 PM   Result Value Ref Range    Sodium, Ur 35.0 mmol/L   Urea Nitrogen, Urine    Collection Time: 11/23/22  5:30 PM   Result Value Ref Range    Urea Nitrogen, Ur 545 mg/dL   Eosinophil Smear, Urine    Collection Time: 11/23/22  5:30 PM   Result Value Ref Range    Eosinophil, Ur Insuf    Creatinine, Random Urine    Collection Time: 11/23/22  5:30 PM   Result Value Ref Range    Creatinine, Ur 128.9 4.2 - 622.0 mg/dL   CBC with Auto Differential    Collection Time: 11/23/22  6:20 PM   Result Value Ref Range    WBC 7.0 4.8 - 10.8 K/uL    RBC 4.78 4.70 - 6.10 M/uL    Hemoglobin 14.0 14.0 - 18.0 g/dL Hematocrit 41.9 (L) 42.0 - 52.0 %    MCV 87.7 80.0 - 94.0 fL    MCH 29.3 27.0 - 31.0 pg    MCHC 33.4 33.0 - 37.0 g/dL    RDW 12.7 11.5 - 14.5 %    Platelets 520 901 - 719 K/uL    MPV 9.7 9.4 - 12.4 fL    Neutrophils % 67.9 (H) 50.0 - 65.0 %    Lymphocytes % 15.0 (L) 20.0 - 40.0 %    Monocytes % 8.3 0.0 - 10.0 %    Eosinophils % 7.9 (H) 0.0 - 5.0 %    Basophils % 0.6 0.0 - 1.0 %    Neutrophils Absolute 4.8 1.5 - 7.5 K/uL    Immature Granulocytes # 0.0 K/uL    Lymphocytes Absolute 1.1 1.1 - 4.5 K/uL    Monocytes Absolute 0.60 0.00 - 0.90 K/uL    Eosinophils Absolute 0.60 0.00 - 0.60 K/uL    Basophils Absolute 0.00 0.00 - 0.20 K/uL   Comprehensive Metabolic Panel w/ Reflex to MG    Collection Time: 11/23/22  6:20 PM   Result Value Ref Range    Sodium 137 136 - 145 mmol/L    Potassium reflex Magnesium 3.7 3.5 - 5.0 mmol/L    Chloride 102 98 - 111 mmol/L    CO2 25 22 - 29 mmol/L    Anion Gap 10 7 - 19 mmol/L    Glucose 138 (H) 74 - 109 mg/dL    BUN 38 (H) 8 - 23 mg/dL    Creatinine 1.9 (H) 0.5 - 1.2 mg/dL    Est, Glom Filt Rate 36 (A) >60    Calcium 8.6 (L) 8.8 - 10.2 mg/dL    Total Protein 7.0 6.6 - 8.7 g/dL    Albumin 4.2 3.5 - 5.2 g/dL    Total Bilirubin 0.8 0.2 - 1.2 mg/dL    Alkaline Phosphatase 101 40 - 130 U/L    ALT 20 5 - 41 U/L    AST 22 5 - 40 U/L   PTH, Intact    Collection Time: 11/23/22  6:20 PM   Result Value Ref Range    PTH 84.0 (H) 15.0 - 65.0 pg/mL   Magnesium    Collection Time: 11/23/22  6:20 PM   Result Value Ref Range    Magnesium 3.0 (H) 1.6 - 2.4 mg/dL   Phosphorus    Collection Time: 11/23/22  6:20 PM   Result Value Ref Range    Phosphorus 3.8 2.5 - 4.5 mg/dL   TSH    Collection Time: 11/23/22  6:20 PM   Result Value Ref Range    TSH 2.490 0.270 - 4.200 uIU/mL   Vitamin D 25 Hydroxy    Collection Time: 11/23/22  6:20 PM   Result Value Ref Range    Vit D, 25-Hydroxy 17.0 (L) >=30 ng/mL   Uric Acid    Collection Time: 11/23/22  6:20 PM   Result Value Ref Range    Uric Acid, Serum 7.7 (H) 3.4 - 7.0 mg/dL   CK    Collection Time: 11/23/22  6:20 PM   Result Value Ref Range    Total  39 - 308 U/L   D-Dimer, Quantitative    Collection Time: 11/23/22  6:20 PM   Result Value Ref Range    D-Dimer, Quant 1.70 (H) 0.00 - 0.48 ug/mL FEU   C-Reactive Protein    Collection Time: 11/23/22  6:20 PM   Result Value Ref Range    CRP 3.38 (H) 0.00 - 0.50 mg/dL   Brain Natriuretic Peptide    Collection Time: 11/23/22  6:26 PM   Result Value Ref Range    Pro- 0 - 1,800 pg/mL   COVID-19, Rapid    Collection Time: 11/23/22  7:43 PM    Specimen: Nasopharyngeal Swab   Result Value Ref Range    SARS-CoV-2, NAAT DETECTED (AA) Not Detected   CBC    Collection Time: 11/24/22  3:00 AM   Result Value Ref Range    WBC 5.8 4.8 - 10.8 K/uL    RBC 4.36 (L) 4.70 - 6.10 M/uL    Hemoglobin 12.7 (L) 14.0 - 18.0 g/dL    Hematocrit 38.8 (L) 42.0 - 52.0 %    MCV 89.0 80.0 - 94.0 fL    MCH 29.1 27.0 - 31.0 pg    MCHC 32.7 (L) 33.0 - 37.0 g/dL    RDW 12.5 11.5 - 14.5 %    Platelets 549 504 - 185 K/uL    MPV 9.5 9.4 - 12.4 fL   Basic Metabolic Panel    Collection Time: 11/24/22  3:00 AM   Result Value Ref Range    Sodium 141 136 - 145 mmol/L    Potassium 4.0 3.5 - 5.0 mmol/L    Chloride 109 98 - 111 mmol/L    CO2 28 22 - 29 mmol/L    Anion Gap 4 (L) 7 - 19 mmol/L    Glucose 102 74 - 109 mg/dL    BUN 30 (H) 8 - 23 mg/dL    Creatinine 1.5 (H) 0.5 - 1.2 mg/dL    Est, Glom Filt Rate 48 (A) >60    Calcium 8.0 (L) 8.8 - 10.2 mg/dL   Iron and TIBC    Collection Time: 11/24/22  3:00 AM   Result Value Ref Range    Iron 33 (L) 59 - 158 ug/dL    TIBC 263 250 - 400 ug/dL    Iron Saturation 13 (L) 14 - 50 %   Ferritin    Collection Time: 11/24/22  3:00 AM   Result Value Ref Range    Ferritin 194.8 30.0 - 400.0 ng/mL   Vitamin B12    Collection Time: 11/24/22  3:00 AM   Result Value Ref Range    Vitamin B-12 168 (L) 211 - 946 pg/mL   Folate    Collection Time: 11/24/22  3:00 AM   Result Value Ref Range    Folate 12.3 4.5 - 32.2 ng/mL       I/O last 3 completed shifts:  In: -   Out: 1400 [Urine:1400]  No intake/output data recorded.       Current Facility-Administered Medications:     melatonin disintegrating tablet 5 mg, 5 mg, Oral, Nightly, Shellie Taylor MD    traZODone (DESYREL) tablet 50 mg, 50 mg, Oral, Nightly PRN, Shellie Taylor MD    sennosides-docusate sodium (SENOKOT-S) 8.6-50 MG tablet 2 tablet, 2 tablet, Oral, Daily, Shellie Taylor MD, 2 tablet at 11/24/22 1917    allopurinol (ZYLOPRIM) tablet 100 mg, 100 mg, Oral, Daily, Shellie Taylor MD, 100 mg at 11/24/22 0843    enoxaparin (LOVENOX) injection 40 mg, 40 mg, SubCUTAneous, Daily, Shellie Taylor MD    vitamin D (ERGOCALCIFEROL) capsule 50,000 Units, 50,000 Units, Oral, Weekly, Shellie Taylor MD, 50,000 Units at 11/24/22 0838    polyethylene glycol (GLYCOLAX) packet 17 g, 17 g, Oral, Daily, Kristofer Perkins MD, 17 g at 11/24/22 4645    sodium chloride flush 0.9 % injection 5-40 mL, 5-40 mL, IntraVENous, 2 times per day, Phoebe Leventhal, APRN - CNP    sodium chloride flush 0.9 % injection 5-40 mL, 5-40 mL, IntraVENous, PRN, Phoebe Leventhal, APRN - CNP    0.9 % sodium chloride infusion, , IntraVENous, PRN, Phoebe Leventhal, APRN - CNP    ondansetron (ZOFRAN-ODT) disintegrating tablet 4 mg, 4 mg, Oral, Q8H PRN **OR** ondansetron (ZOFRAN) injection 4 mg, 4 mg, IntraVENous, Q6H PRN, Phoebe Leventhal, APRN - CNP    acetaminophen (TYLENOL) tablet 650 mg, 650 mg, Oral, Q6H PRN **OR** acetaminophen (TYLENOL) suppository 650 mg, 650 mg, Rectal, Q6H PRN, Phoebe Leventhal, APRN - CNP    0.9 % sodium chloride infusion, , IntraVENous, Continuous, Kristofer Perkins MD, Last Rate: 125 mL/hr at 11/24/22 0755, Rate Change at 11/24/22 0755    aspirin EC tablet 81 mg, 81 mg, Oral, Daily, Shellie Taylor MD, 81 mg at 11/24/22 0842    atorvastatin (LIPITOR) tablet 40 mg, 40 mg, Oral, Nightly, Shellie Taylor MD, 40 mg at 11/23/22 6659    metoprolol succinate (TOPROL XL) extended release tablet 25 mg, 25 mg, Oral, Daily, Lisa Ramirez MD, 25 mg at 11/24/22 0842    nitroGLYCERIN (NITROSTAT) SL tablet 0.4 mg, 0.4 mg, SubLINGual, Q5 Min PRN, Lisa Ramirez MD    clopidogrel (PLAVIX) tablet 75 mg, 75 mg, Oral, Daily, Lisa Ramirez MD, 75 mg at 11/24/22 0842    amLODIPine (NORVASC) tablet 5 mg, 5 mg, Oral, Daily, Lisa Ramirez MD, 5 mg at 11/24/22 0843    hydrALAZINE (APRESOLINE) injection 5 mg, 5 mg, IntraVENous, Q4H PRN, Lisa Ramirez MD    tamsulosin [facility]) capsule 0.4 mg, 0.4 mg, Oral, Daily, DOUGIE Larkin - CNP, 0.4 mg at 11/24/22 1246          Assessment/plan  Principal Problem:    BERTHA (acute kidney injury) [facility])  Active Problems:    History of cardiac cath    Lab test positive for detection of COVID-19 virus  Resolved Problems:    * No resolved hospital problems.  *      BERTHA, rule out postobstructive etiology  Mosley in place, dark urine with some hematuria, need to continue on dual antiplatelet therapy given recent coronary stenting  Urinalysis reviewed-no evidence of UTI  - Continue IV fluid hydration  - Follow urine output and labs  - Obtain CT with kidney/urinary tract protocol    COVID positive without symptoms or hypoxia  Supportive care    CAD, s/p PCI with stents during recent hospitalization  Dual antiplatelet therapy, statin, guideline directed medical therapy    Hypertension  Medical management    Mildly elevated D-dimer on lab, however age-adjusted D-dimer not elevated, low suspicion for any VTE      Yordan Roland MD 11/24/2022 12:11 PM

## 2022-11-24 NOTE — H&P
Chel Delatorre - History & Physical      PCP: No primary care provider on file. Date of Admission: 11/23/2022    Date of Service: 11/23/2022    Chief Complaint:  Dysuria    History Of Present Illness: The patient is a 78 y.o. male who presented to [facility] ED complaining of dysuria. Pt has history of NSTEMI with cardiac catheterization and stent placement discharged from this facility of 11/19/2022. Pt reports burning with urination and urinary frequency sent here from urgent care. He has had bilateral lower abdominal pain and suprapubic abdominal discomfort. He has had no fevers or vomiting. He denies presentation for chest pain. He denies similar past problems. In ED, creatinine 2.2 and 1.9/bun 41 and 38 today-creatinine 1.0 on 11/20/2022. Glucose 138, potassium 3.7, sodium 137, LFTs normal, pro-bnp 192, wbc 7k, hgb 14, platelets 892P, UA micro with 12 wbc, negative bacteria. Pt is admitted to hospitalist service for further evaluation and treatment. Past Medical History:        Diagnosis Date    GERD (gastroesophageal reflux disease)     History of tobacco abuse as teen ager     Myocardial infarction [facility])        Past Surgical History:        Procedure Laterality Date    CORONARY ANGIOPLASTY WITH STENT PLACEMENT  11/19/2022    x3    FOOT SURGERY Right 1999    for a heel fracture       Home Medications:  Prior to Admission medications    Medication Sig Start Date End Date Taking? Authorizing Provider   aspirin 81 MG EC tablet Take 1 tablet by mouth daily 11/21/22   Antonio Torres MD   nitroGLYCERIN (NITROSTAT) 0.4 MG SL tablet Place 1 tablet under the tongue every 5 minutes as needed for Chest pain up to max of 3 total doses.  If no relief after 1 dose, call 911. 11/20/22   Antonio Torres MD   atorvastatin (LIPITOR) 80 MG tablet Take 1 tablet by mouth nightly 11/20/22   Antonio Torres MD   valsartan (DIOVAN) 80 MG tablet Take 1 tablet by mouth 2 times daily 11/20/22   Devota Rock Josiane Perez MD   metoprolol succinate (TOPROL XL) 25 MG extended release tablet Take 1 tablet by mouth daily 22   Duong Luu MD   clopidogrel (PLAVIX) 75 MG tablet Take 1 tablet by mouth daily 22   Duong Luu MD   esomeprazole Magnesium (NEXIUM) 20 MG PACK Take 20 mg by mouth as needed    Historical Provider, MD       Allergies:    Patient has no known allergies. Social History:    The patient currently lives at home  Tobacco:   reports that he quit smoking about 68 years ago. His smoking use included cigarettes. He has a 0.50 pack-year smoking history. He has never used smokeless tobacco.  Alcohol:   reports that he does not currently use alcohol. Illicit Drugs: denies    Family History:      Problem Relation Age of Onset    Other Mother          of complications of neck surgery when the patient was 11years old    No Known Problems Father         Rohan Sharma seen him 6-8 times in my whole life\"    No Known Problems Sister     No Known Problems Maternal Grandmother     Stroke Maternal Grandfather         at age 61 had a CVA and  within 1-2 years of that    Other Son          in a MVA    No Known Problems Other     No Known Problems Other        Review of Systems:   Review of Systems   Constitutional:  Negative for fever. Cardiovascular:  Negative for chest pain. Gastrointestinal:  Positive for abdominal pain. Genitourinary:  Positive for difficulty urinating and dysuria. Musculoskeletal:  Positive for back pain. All other systems reviewed and are negative. 14 point review of systems is negative except as specifically addressed above. Physical Examination:  BP (!) 151/90   Pulse 68   Temp 97.7 °F (36.5 °C) (Oral)   Resp 18   Ht 6' (1.829 m)   Wt 203 lb 11.2 oz (92.4 kg)   SpO2 96%   BMI 27.63 kg/m²   Physical Exam  Vitals reviewed. Constitutional:       Appearance: Normal appearance.    HENT:      Right Ear: External ear normal.      Left Ear: External ear fxn/lytes  -avoid nephrotoxic agents  -I's and O's  -daily weight   -rocephin 1g iv q24hrs   -urine culture   -bladder scan   -flomax 0.4mg po daily    Covid-19 infection   -monitor for supplemental oxygen requirements   -contact plus droplet isolation precautions   -supportive care   -spot check spo2 prn   -crp   -ddimer  Resolved Problems:    * No resolved hospital problems. *  Signed:  DOUGIE Yanes CNP, 11/23/2022 9:31 PM      Attestation Statement     I have independently seen and examined this patient and agree with the asesment and plan by mid level provider        Objective:   Vitals: BP (!) 145/71   Pulse 69   Temp 97.6 °F (36.4 °C)   Resp 17   Ht 6' (1.829 m)   Wt 203 lb 11.2 oz (92.4 kg)   SpO2 99%   BMI 27.63 kg/m²   General appearance: alert, appears stated age and cooperative  Skin: Skin color, texture, turgor normal.   HEENT: Head: Normocephalic, no lesions, without obvious abnormality.   Neck: no adenopathy, no carotid bruit, no JVD, supple, symmetrical, trachea midline, and thyroid not enlarged, symmetric, no tenderness/mass/nodules  Lungs: clear to auscultation bilaterally  Heart: regular rate and rhythm, S1, S2 normal, no murmur, click, rub or gallop  Abdomen: soft, non-tender; bowel sounds normal; no masses,  no organomegaly  Extremities: extremities normal, atraumatic, no cyanosis or edema  Lymphatic: No significant lymph node enlargement papable  Neurologic: Mental status: Alert, oriented, thought content appropriate      Assessment & Plan:    BERTHA- aggressive hydration, renal us am   Questionable uti- iv ab- follow cultures   Covid infection- no symptoms   HTN  CAD- sp recent cardiac cath     Teresa Sterling MD

## 2022-11-25 VITALS
RESPIRATION RATE: 20 BRPM | SYSTOLIC BLOOD PRESSURE: 132 MMHG | BODY MASS INDEX: 27.59 KG/M2 | HEART RATE: 63 BPM | DIASTOLIC BLOOD PRESSURE: 71 MMHG | WEIGHT: 203.7 LBS | TEMPERATURE: 98.2 F | OXYGEN SATURATION: 96 % | HEIGHT: 72 IN

## 2022-11-25 LAB
ANION GAP SERPL CALCULATED.3IONS-SCNC: 7 MMOL/L (ref 7–19)
BUN BLDV-MCNC: 17 MG/DL (ref 8–23)
CALCIUM SERPL-MCNC: 7.8 MG/DL (ref 8.8–10.2)
CHLORIDE BLD-SCNC: 109 MMOL/L (ref 98–111)
CO2: 24 MMOL/L (ref 22–29)
CREAT SERPL-MCNC: 1 MG/DL (ref 0.5–1.2)
GFR SERPL CREATININE-BSD FRML MDRD: >60 ML/MIN/{1.73_M2}
GLUCOSE BLD-MCNC: 96 MG/DL (ref 74–109)
HCT VFR BLD CALC: 35.4 % (ref 42–52)
HEMOGLOBIN: 11.6 G/DL (ref 14–18)
MCH RBC QN AUTO: 29.2 PG (ref 27–31)
MCHC RBC AUTO-ENTMCNC: 32.8 G/DL (ref 33–37)
MCV RBC AUTO: 89.2 FL (ref 80–94)
PDW BLD-RTO: 12.4 % (ref 11.5–14.5)
PLATELET # BLD: 145 K/UL (ref 130–400)
PMV BLD AUTO: 9.5 FL (ref 9.4–12.4)
POTASSIUM SERPL-SCNC: 3.8 MMOL/L (ref 3.5–5)
RBC # BLD: 3.97 M/UL (ref 4.7–6.1)
SODIUM BLD-SCNC: 140 MMOL/L (ref 136–145)
URINE CULTURE, ROUTINE: NORMAL
URINE CULTURE, ROUTINE: NORMAL
WBC # BLD: 6 K/UL (ref 4.8–10.8)

## 2022-11-25 PROCEDURE — 85027 COMPLETE CBC AUTOMATED: CPT

## 2022-11-25 PROCEDURE — 6360000002 HC RX W HCPCS: Performed by: HOSPITALIST

## 2022-11-25 PROCEDURE — 80048 BASIC METABOLIC PNL TOTAL CA: CPT

## 2022-11-25 PROCEDURE — 6370000000 HC RX 637 (ALT 250 FOR IP): Performed by: NURSE PRACTITIONER

## 2022-11-25 PROCEDURE — 6370000000 HC RX 637 (ALT 250 FOR IP): Performed by: HOSPITALIST

## 2022-11-25 PROCEDURE — 36415 COLL VENOUS BLD VENIPUNCTURE: CPT

## 2022-11-25 PROCEDURE — 94760 N-INVAS EAR/PLS OXIMETRY 1: CPT

## 2022-11-25 RX ORDER — TAMSULOSIN HYDROCHLORIDE 0.4 MG/1
0.4 CAPSULE ORAL DAILY
Qty: 30 CAPSULE | Refills: 1 | Status: SHIPPED | OUTPATIENT
Start: 2022-11-26

## 2022-11-25 RX ORDER — ERGOCALCIFEROL 1.25 MG/1
50000 CAPSULE ORAL WEEKLY
Qty: 5 CAPSULE | Refills: 0 | Status: SHIPPED | OUTPATIENT
Start: 2022-12-02

## 2022-11-25 RX ADMIN — ALLOPURINOL 100 MG: 100 TABLET ORAL at 08:08

## 2022-11-25 RX ADMIN — CYANOCOBALAMIN 1000 MCG: 1000 INJECTION, SOLUTION INTRAMUSCULAR; SUBCUTANEOUS at 08:08

## 2022-11-25 RX ADMIN — METOPROLOL SUCCINATE 25 MG: 25 TABLET, EXTENDED RELEASE ORAL at 08:08

## 2022-11-25 RX ADMIN — TAMSULOSIN HYDROCHLORIDE 0.4 MG: 0.4 CAPSULE ORAL at 08:08

## 2022-11-25 RX ADMIN — ERGOCALCIFEROL 50000 UNITS: 1.25 CAPSULE ORAL at 08:08

## 2022-11-25 RX ADMIN — AMLODIPINE BESYLATE 5 MG: 5 TABLET ORAL at 08:08

## 2022-11-25 RX ADMIN — FERROUS SULFATE TAB 325 MG (65 MG ELEMENTAL FE) 325 MG: 325 (65 FE) TAB at 08:08

## 2022-11-25 RX ADMIN — CLOPIDOGREL BISULFATE 75 MG: 75 TABLET ORAL at 08:08

## 2022-11-25 RX ADMIN — ASPIRIN 81 MG: 81 TABLET, COATED ORAL at 08:08

## 2022-11-25 RX ADMIN — SENNOSIDES AND DOCUSATE SODIUM 2 TABLET: 50; 8.6 TABLET ORAL at 08:08

## 2022-11-25 NOTE — PLAN OF CARE
Problem: ABCDS Injury Assessment  Goal: Absence of physical injury  Outcome: Progressing  Flowsheets (Taken 11/24/2022 2004)  Absence of Physical Injury: Implement safety measures based on patient assessment

## 2022-11-25 NOTE — DISCHARGE INSTRUCTIONS
MAKE AN APPOINTMENT TO FOLLOW UP WITH UROLOGY IN 2 WEEKS at [facility] [place], [place]. [address]. May see any available provider in office.

## 2022-11-25 NOTE — CARE COORDINATION
Patient Contact Information:  [address]  [place]  (home)   Above information verified? [x]   Yes  []   No    Have you been vaccinated for COVID-19 (SARS-CoV-2)? [x]   Yes  []   No                   If so, when? Which :  [x]   Pfizer-BioNTTravelKnowledge  []   Moderna  []   Carlean Michael  []   Other:       Pharmacy:    [facility], [address] [place] [address] [address] [place] [address]  Phone: [phone] Fax: [phone]      Active with HD/PD prior to admission:           []   Yes  [x]   No  HD Center:       Financial:  Payor: Betina Cordoba / Plan: Gigi Come / Product Type: *No Product type* /     Pre-Cert required for SNF:   [x]   Yes  []   No    Patient Deficits:  []   Yes   [x]   No    If yes:  []   Confusion/Memory  []   Visual  []   Motor/Sensory         []   Right arm         []   Right leg         []   Left arm         []   Left leg  []   Language/Speech         []   Aphasia         []   Dysarthria         []   Swallow         Oley Coma Scale  Eye Opening: Spontaneous  Best Verbal Response: Oriented  Best Motor Response: Obeys commands  Radha Coma Scale Score: 15    Patient Deficit Notes: Additional CM/SW Notes:   Patient lives at home alone and plans to return home at discharge. Prior to admission, patient was independent with ADLs. Patient is not interested in [facility]. Patient is agreeable to obtain a PCP. Aimee Mike RN  [facility]  Care [facility]     11/25/22 6047   Service Assessment   Patient Orientation Alert and Oriented   Cognition Alert   History Provided By Patient   Primary Caregiver Family   Support Systems Family Members   PCP Verified by CM Yes  (Patient does not have a PCP.   Encouraged patient to obtain a PCP.)   Prior Functional Level Independent in ADLs/IADLs   Current Functional Level Independent in ADLs/IADLs   Can patient return to prior living arrangement Yes   Ability to make needs known: Good   Family able to assist with home care needs: Yes   Would you like for me to discuss the discharge plan with any other family members/significant others, and if so, who? No   [employer]   (no financial needs identified)   Social/Functional History   Lives With Alone   Type of [address] One level   [facility] 46 to enter with rails   Entrance Stairs - Number of Steps 2   Bathroom Shower/Tub Tub/Shower unit   [employer] Help From Family   ADL Assistance Independent   Ambulation Assistance Independent   Transfer Assistance Independent   Active  Yes   Occupation Full time employment   Type of Occupation Maintenance at [employer]   Type of [facility] Prior To Admission None   Potential Assistance Needed N/A   DME Ordered? No   Potential Assistance Purchasing Medications No   Type of Home Care Services None   Patient expects to be discharged to: [facility] Discharge   Services At/After Discharge None   Mode of Transport at Discharge Other (see comment)  Bean Fournier friend\", Clarnce Meter)   Condition of Participation: Discharge Planning   The Patient and/or Patient Representative was provided with a Choice of Provider? Patient   The Patient and/Or Patient Representative agree with the Discharge Plan?  Yes

## 2022-11-25 NOTE — CARE COORDINATION
Attempted to obtain PCP for patient, but physician offices are closed today. Electronically signed by Tre Villegas RN on 11/25/2022 at 12:21 PM    Provided patient with phone number for [facility].   Electronically signed by Tre Villegas RN on 11/25/2022 at 12:46 PM

## 2022-11-25 NOTE — DISCHARGE SUMMARY
Discharge Summary    NAME: Candice Champion  :  1945  MRN:  [id number]    Admit date:  2022  Discharge date:    2022    Advance Directive: Full Code      Primary Care Physician:  No primary care provider on file. Discharge Diagnoses:  Principal Problem:    BERTHA (acute kidney injury) ([facility] [address]  Active Problems:  Enlarged prostate  Urinary retention    History of cardiac cath    Lab test positive for detection of COVID-19 virus          Significant Diagnostic Studies:   XR ABDOMEN (KUB) (SINGLE AP VIEW)    Result Date: 2022  NO PRIOR REPORT AVAILABLE Exam:X-RAY OF THE ABDOMEN, KUB Clinical data:Abdominal pain. Technique: Two views of the abdomen are submitted. Prior studies: No prior studies submitted. Findings: There is a nonspecific nonobstructed bowel gas pattern without free intraperitoneal air. No abnormal calcifications or organomegaly are present. The soft tissues and bony structures are unremarkable. Mild ileus Recommendation: Follow up as clinically indicated. Electronically Signed by Tejal Villanueva MD at 2022 10:54:55 PM EST             CT KIDNEY WO CONTRAST    Result Date: 2022  NO PRIOR REPORT AVAILABLE Exam: CT OF THE ABDOMEN/PELVIS WITHOUT CONTRAST Clinical data: Acute kidney injury with genitourinary symptoms. Evaluate for obstructive uropathy. Technique: Direct contiguous axial CT images were acquired through the abdomen and pelvis without contrast using soft tissue and bone algorithms. Reformatted/MPR images were performed. Radiation dose: CTDIvol =13.95 mGy, DLP =884.59 mGy x cm. Limitations: Lack of intravenous contrast limits evaluation of solid viscera. Lack of oral contrast limits evaluation of the bowel loops. Prior Studies: Radiographs of the abdomen dated 22. Findings: Lung bases: Streaky markings in the lung bases, likely atelectasis or scar. Coronary arterial atherosclerotic calcifications. Liver:Unremarkable size, contour, and density.  No evidence of mass. No evidence of dilated ducts. Gallbladder: Unremarkable. Spleen: Grossly unremarkable. Pancreas:  Grossly unremarkable. Adrenal glands: Grossly unremarkable size, contour and density. Kidneys: In anatomic position. Grossly unremarkablerenal size, contour and density. No renal or ureteral calculi. No hydronephrosis. Mild perinephric fat stranding. Retroperitoneum: No retroperitoneal lymphadenopathy. Atherosclerotic calcifications and ectasia of the abdominal aorta. The IVC is grossly unremarkable. Peritoneal cavity: No evidence of free air or ascites. The small umbilical hernia contains only fat Gastrointestinal tract: Nondistended small bowel. Air and stool throughout the colon. Appendix: Not definitively visualized; no inflammatory changes in the right lower quadrant. Pelvis: Mosley balloon in the nearly collapsed bladder; small volume of air in the bladder is likely related to mild pericystic fat stranding could be inflammatory. Mosley catheter manipulation. The prostate is moderately enlarged. Phleboliths. Small inguinal hernias contain only fat. Osseous structures: Degenerative disc disease. The L4-5 level is likely fused No acute or destructive bony process identified. 1. Mosley balloon in the nearly collapsed bladder. 2. Mild fat stranding around the bladder could be related to cystitis. 3. The mild perinephric fat stranding could be inflammatory and/or postinflammatory scar. 4. No renal calculus; no hydronephrosis. 5. No evidence of small-bowel obstruction. Recommendation: Follow up as clinically indicated. All CT scans at this facility utilize dose modulation, iterative reconstruction, and/or weight based dosing when appropriate to reduce radiation dose to as low as reasonably achievable.  Electronically Signed by Matilde Jimenes MD at 24-Nov-2022 12:20:29 PM EST               Pertinent Labs:   CBC:   Recent Labs     11/23/22  1820 11/24/22  0300 11/25/22  0610   WBC 7.0 5.8 6.0   HGB 14.0 12.7* 11.6*  147 145     BMP:    Recent Labs     11/23/22  1820 11/24/22  0300 11/25/22  0610    141 140   K 3.7 4.0 3.8    109 109   CO2 25 28 24   BUN 38* 30* 17   CREATININE 1.9* 1.5* 1.0   GLUCOSE 138* 102 96           Hospital Course:    81-year-old male recently hospitalized with chest pain and found to have obstructive CAD s/p PCI with stents to OM1, mid LAD and first diagonal, discharged on dual antiplatelet therapy, presenting back with a few days of genitourinary symptoms and back pain, found to have acute kidney injury, urinalysis without evidence of infection however some hematuria. Managed with Mosley placement and IV fluids. CT kidney/urinary tract protocol obtained which did not reveal any hydronephrosis or kidney stone, however did show an enlarged prostate. Incidentally found to be COVID-positive however no significant symptomatology, fevers or hypoxia. His symptoms essentially resolved. His renal function improved back near baseline with IV fluid hydration with Mosley catheter in place. He was maintained on Flomax. On 11/25 Mosley was removed and he was able to urinate on his own with no significant retention on postvoid residual check. Medically stable for discharge home, set up for outpatient urology follow-up in 2 weeks. Flomax added to his medication regimen. With regard to coronary disease with recent stenting from prior hospitalization, he will maintain on current cardiovascular regimen including dual antiplatelet therapy and has outpatient cardiology follow-up. Physical Exam:  Vital Signs: /71   Pulse 63   Temp 98.2 °F (36.8 °C)   Resp 20   Ht 6' (1.829 m)   Wt 203 lb 11.2 oz (92.4 kg)   SpO2 98%   BMI 27.63 kg/m²   General appearance:. Alert and Cooperative   HEENT: Normocephalic. Chest: clear to auscultation bilaterally without wheezes or rhonchi.   Cardiac: Normal heart tones with regular rate and rhythm, S1, S2 normal.   Abdomen:soft, non-tender; normal bowel sounds  Extremities: No clubbing or cyanosis. No peripheral edema. Peripheral pulses palpable. Neurologic: Grossly intact. Discharge Medications:         Medication List        START taking these medications      tamsulosin 0.4 MG capsule  Commonly known as: FLOMAX  Take 1 capsule by mouth daily  Start taking on: November 26, 2022     Vitamin D (Ergocalciferol) 32938 units Caps  Take 50,000 Units by mouth once a week  Start taking on: December 2, 2022            CONTINUE taking these medications      aspirin 81 MG EC tablet  Take 1 tablet by mouth daily     atorvastatin 80 MG tablet  Commonly known as: LIPITOR  Take 1 tablet by mouth nightly     clopidogrel 75 MG tablet  Commonly known as: PLAVIX  Take 1 tablet by mouth daily     esomeprazole Magnesium 20 MG Pack  Commonly known as: NEXIUM     metoprolol succinate 25 MG extended release tablet  Commonly known as: TOPROL XL  Take 1 tablet by mouth daily     nitroGLYCERIN 0.4 MG SL tablet  Commonly known as: NITROSTAT  Place 1 tablet under the tongue every 5 minutes as needed for Chest pain up to max of 3 total doses. If no relief after 1 dose, call 911.     valsartan 80 MG tablet  Commonly known as: DIOVAN  Take 1 tablet by mouth 2 times daily               Where to Get Your Medications        These medications were sent to [facility], [address] [address]      Hours: 24-hours Phone: [phone]   tamsulosin 0.4 MG capsule  Vitamin D (Ergocalciferol) 32178 units Caps         Discharge Instructions: Follow up with primary care provider within 1 week. Take medications as directed. Resume activity as tolerated. Disposition: Patient is medically stable and will be discharged home.     Time spent on discharge 37 minutes    Signed:  Glynn Nelson MD  11/25/2022 9:42 AM

## 2022-11-25 NOTE — PLAN OF CARE
Problem: ABCDS Injury Assessment  Goal: Absence of physical injury  Outcome: Completed  Flowsheets (Taken 11/25/2022 0800)  Absence of Physical Injury: Implement safety measures based on patient assessment

## 2022-11-25 NOTE — ACP (ADVANCE CARE PLANNING)
Advance Care Planning     Advance Care Planning Activator (Inpatient)  Conversation Note      Date of ACP Conversation: 11/25/2022     Conversation Conducted with: Patient with Decision Making Capacity    ACP Activator: [id number] Decision Maker:     Current Designated Health Care Decision Maker:     Primary Decision Maker: Jesse Cox - [phone]        Care Preferences    Ventilation:  Would the patient desire the use of ventilator (breathing machine)?: \"If I am going to die, better not to put me in it. \"      Resuscitation  \"CPR works best to restart the heart when there is a sudden event, like a heart attack, in someone who is otherwise healthy. Unfortunately, CPR does not typically restart the heart for people who have serious health conditions or who are very sick. \"    \"In the event your heart stopped as a result of an underlying serious health condition, would you want attempts to be made to restart your heart (answer \"yes\" for attempt to resuscitate) or would you prefer a natural death (answer \"no\" for do not attempt to resuscitate)? \" yes       [] Yes   [x] No   Educated Patient / Johanna Montiel regarding differences between Advance Directives and portable DNR orders. Length of ACP Conversation in minutes:  30    Conversation Outcomes:  [x] ACP discussion completed        Follow-up plan:    [x] To advise patient/agent/surrogate to complete a ACP document: LW or POA-HC and designate Primary DM as surrogate/agent/-at-law.     Electronically signed by Meenakshi Parsons MA [facility] on 11/25/2022 at 10:24 AM

## 2022-11-28 ENCOUNTER — TELEPHONE (OUTPATIENT)
Dept: CARDIOLOGY CLINIC | Age: 77
End: 2022-11-28

## 2022-11-28 LAB
EKG P AXIS: 51 DEGREES
EKG P-R INTERVAL: 184 MS
EKG Q-T INTERVAL: 460 MS
EKG QRS DURATION: 146 MS
EKG QTC CALCULATION (BAZETT): 461 MS
EKG T AXIS: 8 DEGREES

## 2022-11-28 NOTE — TELEPHONE ENCOUNTER
Pt called stating his legs and feet are swelling. Pt states no SOA. Pt hasn't weighed himself. Pt was back in hospital for kidney issues.

## 2022-11-29 ENCOUNTER — TELEPHONE (OUTPATIENT)
Dept: UROLOGY | Age: 77
End: 2022-11-29

## 2022-11-29 ENCOUNTER — TELEPHONE (OUTPATIENT)
Dept: CARDIAC REHAB | Age: 77
End: 2022-11-29

## 2022-11-29 NOTE — TELEPHONE ENCOUNTER
Was able to talk to pt via phone concerning benefits of cardiac rehab. He refuses at present. Encouraged to call if changes his mind.

## 2022-11-29 NOTE — TELEPHONE ENCOUNTER
David Cantu MD  You 15 hours ago (4:28 PM)     AM  His leg swelling is likely related to the kidney worsening which had improved to normal.  He likely received fluids in the hospital.  Please check if he is urinating well. Can follow-up with PCP and reassess if need for diuretic and to check renal functions. If any shortness of breath or chest pain he will need to call immediately or go to the ER. Maintain on Plavix uninterrupted.

## 2022-11-29 NOTE — TELEPHONE ENCOUNTER
Patient was in hospital on  11-23-22 and was told to Follow-Ups: Schedule an appointment with [facility] (Urology) in 2 weeks (12/9/2022); Urology appointment - urinary retention with enlarged prostate. Please called the patient.       Thank You

## 2022-12-05 ENCOUNTER — HOSPITAL ENCOUNTER (EMERGENCY)
Age: 77
Discharge: HOME | End: 2022-12-05
Attending: EMERGENCY MEDICINE
Payer: MEDICARE

## 2022-12-05 ENCOUNTER — OFFICE VISIT (OUTPATIENT)
Age: 77
End: 2022-12-05

## 2022-12-05 VITALS
TEMPERATURE: 97.9 F | SYSTOLIC BLOOD PRESSURE: 150 MMHG | OXYGEN SATURATION: 98 % | HEART RATE: 65 BPM | DIASTOLIC BLOOD PRESSURE: 78 MMHG | RESPIRATION RATE: 18 BRPM

## 2022-12-05 VITALS
OXYGEN SATURATION: 99 % | WEIGHT: 213.2 LBS | BODY MASS INDEX: 28.88 KG/M2 | TEMPERATURE: 97.8 F | HEIGHT: 72 IN | RESPIRATION RATE: 18 BRPM | HEART RATE: 71 BPM

## 2022-12-05 DIAGNOSIS — R60.0 PEDAL EDEMA: Primary | ICD-10-CM

## 2022-12-05 LAB
ALBUMIN SERPL-MCNC: 3.6 G/DL (ref 3.5–5.2)
ALP BLD-CCNC: 91 U/L (ref 40–130)
ALT SERPL-CCNC: 23 U/L (ref 5–41)
ANION GAP SERPL CALCULATED.3IONS-SCNC: 10 MMOL/L (ref 7–19)
AST SERPL-CCNC: 18 U/L (ref 5–40)
BASOPHILS ABSOLUTE: 0.1 K/UL (ref 0–0.2)
BASOPHILS RELATIVE PERCENT: 1 % (ref 0–1)
BILIRUB SERPL-MCNC: 0.7 MG/DL (ref 0.2–1.2)
BILIRUBIN URINE: NEGATIVE
BLOOD, URINE: NEGATIVE
BUN BLDV-MCNC: 16 MG/DL (ref 8–23)
CALCIUM SERPL-MCNC: 8.7 MG/DL (ref 8.8–10.2)
CHLORIDE BLD-SCNC: 104 MMOL/L (ref 98–111)
CLARITY: CLEAR
CO2: 26 MMOL/L (ref 22–29)
COLOR: YELLOW
CREAT SERPL-MCNC: 1.1 MG/DL (ref 0.5–1.2)
EOSINOPHILS ABSOLUTE: 0.3 K/UL (ref 0–0.6)
EOSINOPHILS RELATIVE PERCENT: 4.4 % (ref 0–5)
GFR SERPL CREATININE-BSD FRML MDRD: >60 ML/MIN/{1.73_M2}
GLUCOSE BLD-MCNC: 188 MG/DL (ref 74–109)
GLUCOSE URINE: NEGATIVE MG/DL
HCT VFR BLD CALC: 37 % (ref 42–52)
HEMOGLOBIN: 12 G/DL (ref 14–18)
IMMATURE GRANULOCYTES #: 0 K/UL
KETONES, URINE: NEGATIVE MG/DL
LEUKOCYTE ESTERASE, URINE: NEGATIVE
LYMPHOCYTES ABSOLUTE: 0.9 K/UL (ref 1.1–4.5)
LYMPHOCYTES RELATIVE PERCENT: 14.9 % (ref 20–40)
MCH RBC QN AUTO: 29.1 PG (ref 27–31)
MCHC RBC AUTO-ENTMCNC: 32.4 G/DL (ref 33–37)
MCV RBC AUTO: 89.6 FL (ref 80–94)
MONOCYTES ABSOLUTE: 0.3 K/UL (ref 0–0.9)
MONOCYTES RELATIVE PERCENT: 5 % (ref 0–10)
NEUTROPHILS ABSOLUTE: 4.4 K/UL (ref 1.5–7.5)
NEUTROPHILS RELATIVE PERCENT: 74.5 % (ref 50–65)
NITRITE, URINE: NEGATIVE
PDW BLD-RTO: 11.9 % (ref 11.5–14.5)
PH UA: 6.5 (ref 5–8)
PLATELET # BLD: 270 K/UL (ref 130–400)
PMV BLD AUTO: 8.9 FL (ref 9.4–12.4)
POTASSIUM SERPL-SCNC: 3.5 MMOL/L (ref 3.5–5)
PRO-BNP: 671 PG/ML (ref 0–1800)
PROTEIN UA: NEGATIVE MG/DL
RBC # BLD: 4.13 M/UL (ref 4.7–6.1)
SODIUM BLD-SCNC: 140 MMOL/L (ref 136–145)
SPECIFIC GRAVITY UA: 1.01 (ref 1–1.03)
TOTAL PROTEIN: 6.9 G/DL (ref 6.6–8.7)
UROBILINOGEN, URINE: 0.2 E.U./DL
WBC # BLD: 6 K/UL (ref 4.8–10.8)

## 2022-12-05 PROCEDURE — 36415 COLL VENOUS BLD VENIPUNCTURE: CPT

## 2022-12-05 PROCEDURE — 99999 PR OFFICE/OUTPT VISIT,PROCEDURE ONLY: CPT | Performed by: PHYSICIAN ASSISTANT

## 2022-12-05 PROCEDURE — 80053 COMPREHEN METABOLIC PANEL: CPT

## 2022-12-05 PROCEDURE — 83880 ASSAY OF NATRIURETIC PEPTIDE: CPT

## 2022-12-05 PROCEDURE — 81003 URINALYSIS AUTO W/O SCOPE: CPT

## 2022-12-05 PROCEDURE — 85025 COMPLETE CBC W/AUTO DIFF WBC: CPT

## 2022-12-05 PROCEDURE — 99283 EMERGENCY DEPT VISIT LOW MDM: CPT

## 2022-12-05 ASSESSMENT — PAIN - FUNCTIONAL ASSESSMENT: PAIN_FUNCTIONAL_ASSESSMENT: NONE - DENIES PAIN

## 2022-12-05 NOTE — ED PROVIDER NOTES
[facility] EMERGENCY DEPT  eMERGENCY dEPARTMENT eNCOUnter      Pt Name: Candice Champion  MRN: [id number]  [place] 1945  Date of evaluation: 12/5/2022  Provider: Elio Hewitt MD    CHIEF COMPLAINT       Chief Complaint   Patient presents with    Leg Swelling     Daniel lower ext edema x 1 week. Pt denies difficulty breathing or shortness of breath    Urinary Frequency         HISTORY OF PRESENT ILLNESS   (Location/Symptom, Timing/Onset,Context/Setting, Quality, Duration, Modifying Factors, Severity)  Note limiting factors. Candice Champion is a 78 y.o. male who presents to the emergency department for evaluation regarding bilateral lower extremity swelling. Patient reports that he has been experiencing increased swelling of his lower extremities for approximately the past 1 week. Feels the symptoms are getting a little bit worse. He has not had any feelings of associated shortness of breath or any shortness of breath on exertion. Notes that he feels he has been going to the bathroom a little bit more frequently. He does have a prior history of coronary artery disease with previous stent placement. States that he is not currently maintained on a diuretic and normally does not have issues with fluid retention. Echocardiogram performed 11/19/2022 demonstrates normal LV function with an ejection fraction of 55 to 60%. HPI    NursingNotes were reviewed. REVIEW OF SYSTEMS    (2-9 systems for level 4, 10 or more for level 5)     Review of Systems   Constitutional:  Negative for chills, fatigue and fever. Respiratory:  Positive for shortness of breath. Negative for chest tightness. Cardiovascular:  Positive for leg swelling. Negative for chest pain and palpitations. Gastrointestinal:  Negative for abdominal pain, diarrhea, nausea and vomiting. Neurological:  Negative for syncope. All other systems reviewed and are negative.          PAST MEDICALHISTORY     Past Medical History:   Diagnosis Date GERD (gastroesophageal reflux disease)     History of tobacco abuse as teen ager     Myocardial infarction [facility])          SURGICAL HISTORY       Past Surgical History:   Procedure Laterality Date    CORONARY ANGIOPLASTY WITH STENT PLACEMENT  11/19/2022    x3    DIAGNOSTIC CARDIAC CATH LAB PROCEDURE      FOOT SURGERY Right     for a heel fracture         CURRENT MEDICATIONS     Discharge Medication List as of 2022  4:27 PM        CONTINUE these medications which have NOT CHANGED    Details   Ergocalciferol (VITAMIN D) 35823 units CAPS Take 50,000 Units by mouth once a week, Disp-5 capsule, R-0Normal      tamsulosin (FLOMAX) 0.4 MG capsule Take 1 capsule by mouth daily, Disp-30 capsule, R-1Normal      aspirin 81 MG EC tablet Take 1 tablet by mouth daily, Disp-30 tablet, R-1Normal      nitroGLYCERIN (NITROSTAT) 0.4 MG SL tablet Place 1 tablet under the tongue every 5 minutes as needed for Chest pain up to max of 3 total doses. If no relief after 1 dose, call 911., Disp-25 tablet, R-1Normal      atorvastatin (LIPITOR) 80 MG tablet Take 1 tablet by mouth nightly, Disp-30 tablet, R-1Normal      clopidogrel (PLAVIX) 75 MG tablet Take 1 tablet by mouth daily, Disp-30 tablet, R-1Normal      valsartan (DIOVAN) 80 MG tablet Take 1 tablet by mouth 2 times daily, Disp-60 tablet, R-1Normal      metoprolol succinate (TOPROL XL) 25 MG extended release tablet Take 1 tablet by mouth daily, Disp-30 tablet, R-1Normal      esomeprazole Magnesium (NEXIUM) 20 MG PACK Take 20 mg by mouth as neededHistorical Med             ALLERGIES     Patient has no known allergies.     FAMILY HISTORY       Family History   Problem Relation Age of Onset    Other Mother          of complications of neck surgery when the patient was 11years old    No Known Problems Father         Stephanie Bustillo seen him 6-8 times in my whole life\"    No Known Problems Sister     No Known Problems Maternal Grandmother     Stroke Maternal Grandfather         at age 61 had a CVA and  within 1-2 years of that    Other Son          in a MVA    No Known Problems Other     No Known Problems Other           SOCIAL HISTORY       Social History     Socioeconomic History    Marital status:     Number of children: 1   Occupational History    Occupation:      Comment: still working   Tobacco Use    Smoking status: Former     Packs/day: 0.25     Years: 2.00     Pack years: 0.50     Types: Cigarettes     Quit date: 1954     Years since quittin.1    Smokeless tobacco: Never    Tobacco comments:     As a teen ager   Vaping Use    Vaping Use: Never used   Substance and Sexual Activity    Alcohol use: Not Currently     Comment: when he was younger socially but not heavily    Drug use: Never   Social History Narrative    CODE STATUS: Full Code    HEALTH CARE PROXY: his grandson, Mr. Jono Cowart, [phone]    AMBULATES: independently    DOMICILED: has no stairs in his home but does at work, lives alone, his grand sons live nearby and he sees them regularly, he has 2 dogs that live outside       [facility] [address] Opening: Spontaneous  Best Verbal Response: Oriented  Best Motor Response: Obeys commands  Radha Coma Scale Score: 15        PHYSICAL EXAM    (up to 7 for level 4, 8 or more for level 5)     ED Triage Vitals [22 1050]   BP Temp Temp src Heart Rate Resp SpO2 Height Weight   (!) 158/75 97.9 °F (36.6 °C) -- 67 16 99 % -- --       Physical Exam  Vitals and nursing note reviewed. HENT:      Head: Atraumatic. Mouth/Throat:      Mouth: Mucous membranes are moist. Mucous membranes are not dry. Eyes:      General: No scleral icterus. Pupils: Pupils are equal, round, and reactive to light. Neck:      Trachea: No tracheal deviation. Cardiovascular:      Rate and Rhythm: Normal rate and regular rhythm. Heart sounds: Normal heart sounds. No murmur heard.   Pulmonary:      Effort: Pulmonary effort is normal. No respiratory distress. Breath sounds: Normal breath sounds. No stridor. Abdominal:      General: There is no distension. Palpations: Abdomen is soft. Tenderness: There is no abdominal tenderness. There is no guarding. Musculoskeletal:      Right lower leg: Edema present. Left lower leg: Edema present. Skin:     Capillary Refill: Capillary refill takes less than 2 seconds. Coloration: Skin is not pale. Findings: No rash. Neurological:      Mental Status: He is alert and oriented to person, place, and time. Psychiatric:         Behavior: Behavior is cooperative. DIAGNOSTIC RESULTS       LABS:  Labs Reviewed   CBC WITH AUTO DIFFERENTIAL - Abnormal; Notable for the following components:       Result Value    RBC 4.13 (*)     Hemoglobin 12.0 (*)     Hematocrit 37.0 (*)     MCHC 32.4 (*)     MPV 8.9 (*)     Neutrophils % 74.5 (*)     Lymphocytes % 14.9 (*)     Lymphocytes Absolute 0.9 (*)     All other components within normal limits   COMPREHENSIVE METABOLIC PANEL - Abnormal; Notable for the following components:    Glucose 188 (*)     Calcium 8.7 (*)     All other components within normal limits   URINALYSIS WITH REFLEX TO CULTURE   BRAIN NATRIURETIC PEPTIDE       All other labs were within normal range or not returned as of this dictation. EMERGENCY DEPARTMENT COURSE and DIFFERENTIAL DIAGNOSIS/MDM:   Vitals:    Vitals:    12/05/22 1500 12/05/22 1530 12/05/22 1600 12/05/22 1625   BP: (!) 142/79 (!) 145/81 (!) 150/78 (!) 150/78   Pulse: 61 72 65 65   Resp:   13 18   Temp:       SpO2: 94% 98% 99% 98%       MDM    Patient's laboratory studies look okay. His vital signs are stable with a normal room air oxygen saturation of 98% on room air. In review of his previous records it appears that he did have some issues with acute kidney injury with elevated serum creatinine as high as 2.2.   It is normal today at 1.0 however he continues to follow with nephrology. PROCEDURES:  Unless otherwise noted below, none     Procedures    FINAL IMPRESSION      1.  Pedal edema          DISPOSITION/PLAN   DISPOSITION Decision To Discharge 12/05/2022 04:20:37 PM      PATIENT REFERRED TO:  Your Nephrologist          DISCHARGE MEDICATIONS:  Discharge Medication List as of 12/5/2022  4:27 PM             (Please note that portions of this note were completed with a voice recognition program.  Efforts were made to edit thedictations but occasionally words are mis-transcribed.)    Serena Coulter MD (electronically signed)  Attending Emergency Physician         Serena Coulter MD  12/18/22 [phone]

## 2022-12-05 NOTE — PROGRESS NOTES
Patient was advised to go ER. He was here 11/23 then was advised to ER, they admitted to hospital. He came in today stated \"he has the same symptoms he had last time he was here\".

## 2022-12-09 ENCOUNTER — OFFICE VISIT (OUTPATIENT)
Dept: UROLOGY | Age: 77
End: 2022-12-09

## 2022-12-09 ENCOUNTER — OFFICE VISIT (OUTPATIENT)
Dept: CARDIOLOGY CLINIC | Age: 77
End: 2022-12-09
Payer: MEDICARE

## 2022-12-09 VITALS
WEIGHT: 210 LBS | HEIGHT: 72 IN | BODY MASS INDEX: 28.44 KG/M2 | OXYGEN SATURATION: 97 % | DIASTOLIC BLOOD PRESSURE: 70 MMHG | SYSTOLIC BLOOD PRESSURE: 118 MMHG | HEART RATE: 63 BPM

## 2022-12-09 VITALS — WEIGHT: 211 LBS | TEMPERATURE: 97.9 F | HEIGHT: 72 IN | BODY MASS INDEX: 28.58 KG/M2

## 2022-12-09 DIAGNOSIS — E78.2 MIXED HYPERLIPIDEMIA: ICD-10-CM

## 2022-12-09 DIAGNOSIS — M25.471 ANKLE EDEMA, BILATERAL: ICD-10-CM

## 2022-12-09 DIAGNOSIS — I25.10 CORONARY ARTERY DISEASE INVOLVING NATIVE CORONARY ARTERY OF NATIVE HEART WITHOUT ANGINA PECTORIS: Primary | ICD-10-CM

## 2022-12-09 DIAGNOSIS — M25.472 ANKLE EDEMA, BILATERAL: ICD-10-CM

## 2022-12-09 DIAGNOSIS — N30.90 CYSTITIS: ICD-10-CM

## 2022-12-09 DIAGNOSIS — Z95.5 HISTORY OF CORONARY ARTERY STENT PLACEMENT: ICD-10-CM

## 2022-12-09 DIAGNOSIS — R33.9 INCOMPLETE BLADDER EMPTYING: ICD-10-CM

## 2022-12-09 DIAGNOSIS — R39.198 DIFFICULTY IN URINATION: Primary | ICD-10-CM

## 2022-12-09 DIAGNOSIS — I10 HYPERTENSION, UNSPECIFIED TYPE: ICD-10-CM

## 2022-12-09 DIAGNOSIS — N13.8 BPH WITH OBSTRUCTION/LOWER URINARY TRACT SYMPTOMS: ICD-10-CM

## 2022-12-09 DIAGNOSIS — R35.0 BENIGN PROSTATIC HYPERPLASIA WITH URINARY FREQUENCY: ICD-10-CM

## 2022-12-09 DIAGNOSIS — N40.1 BENIGN PROSTATIC HYPERPLASIA WITH URINARY FREQUENCY: ICD-10-CM

## 2022-12-09 DIAGNOSIS — N40.1 BPH WITH OBSTRUCTION/LOWER URINARY TRACT SYMPTOMS: ICD-10-CM

## 2022-12-09 LAB
APPEARANCE FLUID: CLEAR
BILIRUBIN, POC: NORMAL
BLOOD URINE, POC: NORMAL
CLARITY, POC: CLEAR
COLOR, POC: YELLOW
GLUCOSE URINE, POC: NORMAL
KETONES, POC: NORMAL
LEUKOCYTE EST, POC: NORMAL
NITRITE, POC: NORMAL
PH, POC: 7
PROTEIN, POC: NORMAL
SPECIFIC GRAVITY, POC: 1.15
UROBILINOGEN, POC: 0.2

## 2022-12-09 PROCEDURE — 1123F ACP DISCUSS/DSCN MKR DOCD: CPT | Performed by: NURSE PRACTITIONER

## 2022-12-09 PROCEDURE — 99214 OFFICE O/P EST MOD 30 MIN: CPT | Performed by: NURSE PRACTITIONER

## 2022-12-09 PROCEDURE — 3078F DIAST BP <80 MM HG: CPT | Performed by: NURSE PRACTITIONER

## 2022-12-09 PROCEDURE — 3074F SYST BP LT 130 MM HG: CPT | Performed by: NURSE PRACTITIONER

## 2022-12-09 RX ORDER — ALFUZOSIN HYDROCHLORIDE 10 MG/1
10 TABLET, EXTENDED RELEASE ORAL DAILY
Qty: 30 TABLET | Refills: 1 | Status: SHIPPED | OUTPATIENT
Start: 2022-12-09

## 2022-12-09 RX ORDER — METOPROLOL SUCCINATE 25 MG/1
12.5 TABLET, EXTENDED RELEASE ORAL DAILY
Qty: 1 TABLET | Refills: 0
Start: 2022-12-09

## 2022-12-09 RX ORDER — CEFDINIR 300 MG/1
300 CAPSULE ORAL 2 TIMES DAILY
Qty: 10 CAPSULE | Refills: 0 | Status: SHIPPED | OUTPATIENT
Start: 2022-12-09 | End: 2022-12-19

## 2022-12-09 RX ORDER — VALSARTAN 80 MG/1
80 TABLET ORAL DAILY
Qty: 1 TABLET | Refills: 0
Start: 2022-12-09

## 2022-12-09 RX ORDER — PHENAZOPYRIDINE HYDROCHLORIDE 100 MG/1
100 TABLET, FILM COATED ORAL 3 TIMES DAILY PRN
Qty: 30 TABLET | Refills: 0 | Status: SHIPPED | OUTPATIENT
Start: 2022-12-09 | End: 2022-12-12

## 2022-12-09 ASSESSMENT — ENCOUNTER SYMPTOMS
ABDOMINAL PAIN: 0
ABDOMINAL DISTENTION: 0
NAUSEA: 0
BACK PAIN: 0
VOMITING: 0

## 2022-12-09 NOTE — PROGRESS NOTES
[facility], [place]. [address], [facility], [address] [facility]  [phone] office  [phone] fax      OFFICE VISIT:  2022    Ramón Magallanes - : 1945  Reason For Visit:  Michael Aguilar is a 78 y.o. male who is here for Follow-up (3 wk post stent    no cardiac symptoms  ,pt has edema and frequent urination)    History:   Diagnosis Orders   1. Coronary artery disease involving native coronary artery of native heart without angina pectoris        2. History of coronary artery stent placement      22 PCI procedure:LAD, Diag/Septal1, LEXII, LAD, LEXII, Left Circumflex, OM 1, LEXII      3. Mixed hyperlipidemia        4. Hypertension, unspecified type        5. Benign prostatic hyperplasia with urinary frequency        6. Cystitis        7. Ankle edema, bilateral          The patient presents today for cardiology follow up. Mr. Bony Lai presented to [facility]) ED on 22 with elevated troponin levels and chest pain. Subsequently, he underwent cardiac catheterization revealing single-vessel, branch disease with culprit large OM1 and obstructive  lesions in mid LAD and large second diagonal.  Normal LV systolic function. The patient had successful PCI to OM1 (2.75 x 30 mm resolute integrity taken to 3.0 mm)  utilizing drug-eluting stent; successful PCI to mid LAD (2.75 x 38 mm resolute integrity taken to 3.0  mm) utilizing drug-eluting stent and  Successful PCI to the second diagonal (2.5 x 18 mm resolute integrity) utilizing drug-eluting stent. The patient reports doing well from a cardiac standpoint other then developing bilateral lower extremity edema. His EF is normal.  22 pro BNP was in normal range at 671 and creatinine at 1.1. The patient presented to ED bert on 22 for BERTHA, urinary retention and COVID 19. He was managed with steinberg catheter and IV fluids. A CT showed no hydronephrosis or calculi.   The scan showed moderate BP and possible cystitis. Urinalyis showed some hematuria with out infection. The patient was started on Flomax at that time. Plavix, Toprol and Diovan were all new medications following the PCI. The patient presented to the ED again on 12/5/22 for pedal edema. He had no chest pain, shortness of breath or PND/orthopnea. The patient states \"the swelling seemed to coincide with starting Flomax which really has not helped. \" The patient reports need to void every 10 minutes with burning sensation. He reports no fever or chills. 12/5/22 pro , sodium 140, K 3.5, creatinine 1.1  Urinalysis clear. BP is well controlled on current regimen. The patient's PCP monitors cholesterol. The patient has a referral appointment with Dr. Mercedes Turner on 12/18/22. He reports today \"I just cannot wait that long. \"    Subjective  Jamison denies exertional chest pain, shortness of breath, orthopnea, paroxysmal nocturnal dyspnea, syncope, presyncope, sensed arrhythmia and fatigue. The patient denies numbness or weakness to suggest cerebrovascular accident or transient ischemic attack. + urinary frequency and dysuria.  + bilateral lower extremity edema.     Matteo Gates has the following history as recorded in [facility]:  Patient Active Problem List   Diagnosis Code    NSTEMI (non-ST elevated myocardial infarction) ([facility] [address] I21.4    Elevated troponin R77.8    BERTHA (acute kidney injury) ([facility] [address] N17.9    History of cardiac cath Z98.890    Lab test positive for detection of COVID-19 virus U07.1     Past Medical History:   Diagnosis Date    GERD (gastroesophageal reflux disease)     History of tobacco abuse as teen ager     Myocardial infarction [facility])      Past Surgical History:   Procedure Laterality Date    CORONARY ANGIOPLASTY WITH STENT PLACEMENT  11/19/2022    x3    DIAGNOSTIC CARDIAC CATH LAB PROCEDURE      FOOT SURGERY Right 1999    for a heel fracture     Family History   Problem Relation Age of Onset    Other Mother  of complications of neck surgery when the patient was 11years old    No Known Problems Father         Armen Alva seen him 6-8 times in my whole life\"    No Known Problems Sister     No Known Problems Maternal Grandmother     Stroke Maternal Grandfather         at age 61 had a CVA and  within 1-2 years of that    Other Son          in a MVA    No Known Problems Other     No Known Problems Other      Social History     Tobacco Use    Smoking status: Former     Packs/day: 0.25     Years: 2.00     Pack years: 0.50     Types: Cigarettes     Quit date: 1954     Years since quittin.1    Smokeless tobacco: Never    Tobacco comments:     As a teen ager   Substance Use Topics    Alcohol use: Not Currently     Comment: when he was younger socially but not heavily      Current Outpatient Medications   Medication Sig Dispense Refill    tamsulosin (FLOMAX) 0.4 MG capsule Take 1 capsule by mouth daily 30 capsule 1    Ergocalciferol (VITAMIN D) 93977 units CAPS Take 50,000 Units by mouth once a week 5 capsule 0    aspirin 81 MG EC tablet Take 1 tablet by mouth daily 30 tablet 1    nitroGLYCERIN (NITROSTAT) 0.4 MG SL tablet Place 1 tablet under the tongue every 5 minutes as needed for Chest pain up to max of 3 total doses. If no relief after 1 dose, call 911. 25 tablet 1    atorvastatin (LIPITOR) 80 MG tablet Take 1 tablet by mouth nightly 30 tablet 1    valsartan (DIOVAN) 80 MG tablet Take 1 tablet by mouth 2 times daily 60 tablet 1    metoprolol succinate (TOPROL XL) 25 MG extended release tablet Take 1 tablet by mouth daily 30 tablet 1    clopidogrel (PLAVIX) 75 MG tablet Take 1 tablet by mouth daily 30 tablet 1    esomeprazole Magnesium (NEXIUM) 20 MG PACK Take 20 mg by mouth as needed       No current facility-administered medications for this visit. Allergies: Patient has no known allergies. Review of Systems  Constitutional - no appetite change, or unexpected weight change.  No fever, chills or diaphoresis. No significant change in activity level or new onset of fatigue. HEENT - no significant rhinorrhea or epistaxis. No tinnitus or significant hearing loss. Eyes - no sudden vision change or amaurosis. No corneal arcus, xantholasma, subconjunctival hemorrhage or discharge. Respiratory - no significant wheezing, stridor, apnea or cough. No dyspnea on exertion or shortness of air. Cardiovascular - no exertional chest pain to suggest myocardial ischemia. No orthopnea or PND. No sensation of sustained arrythmia. No occurrence of slow heart rate. No palpitations. No claudication. Gastrointestinal - no abdominal swelling or pain. No blood in stool. No severe constipation, diarrhea, nausea, or vomiting. Genitourinary - no flank pain or hematuria. + dysuria, frequency, or urgency  Musculoskeletal - no back pain or myalgia. No problems with gait. Extremities - no clubbing and cyanosis. Skin - no color change or rash. No pallor. No new surgical incision. Neurologic - no speech difficulty, facial asymmetry or lateralizing weakness. No seizures, presyncope or syncope. No significant dizziness. Hematologic - no easy bruising or excessive bleeding. Psychiatric - no severe anxiety or insomnia. No confusion. All other review of systems are negative. Objective  Vital Signs - /70   Pulse 63   Ht 6' (1.829 m)   Wt 210 lb (95.3 kg)   SpO2 97%   BMI 28.48 kg/m²   General - Jamison is alert, cooperative, and pleasant. Well groomed. No acute distress. Body habitus - Body mass index is 28.48 kg/m². HEENT - Head is normocephalic. No circumoral cyanosis. Dentition is normal.  EYES -   Lids normal without ptosis. No discharge, edema or subconjunctival hemorrhage. Neck - Symmetrical without apparent mass or lymphadenopathy. Respiratory - Normal respiratory effort without use of accessory muscles.   Ausculatation reveals vesicular breath sounds without crackles, wheezes, rub or rhonchi. Cardiovascular - No jugular venous distention. Auscultation reveals regular rate and rhythm. No audible clicks, gallop or rub. No murmur. No lower extremity varicosities. No carotid bruits. Abdominal -  No visible distention, mass or pulsations. Extremities - No clubbing or cyanosis. No statis dermatitis or ulcers. 2+ pitting bilateral lower extremity  edema. Musculoskeletal -   No Osler's nodes. No kyphosis or scoliosis. Gait is even and regular without limp or shuffle. Ambulates without assistance. Skin -  Warm and dry; no rash or pallor. No new surgical wound. Neurological - No focal neurological deficits. Thought processes coherent. No apparent tremor. Oriented to person, place and time. Psychiatric -  Appropriate affect and mood. Data reviewed:  11/24/22 CT kidney  1. Mosley balloon in the nearly collapsed bladder. 2. Mild fat stranding around the bladder could be related to cystitis. 3. The mild perinephric fat stranding could be inflammatory and/or postinflammatory scar. 4. No renal calculus; no hydronephrosis. 5. No evidence of small-bowel obstruction. Recommendation: Follow up as clinically indicated. All CT scans at this facility utilize dose modulation, iterative reconstruction, and/or weight based dosing when appropriate to reduce radiation dose to as low as reasonably achievable. Electronically Signed by Km Arndt MD at 24-Nov-2022 12:20:29 PM EST              11/20/22 echo   LV is normal in size with normal systolic function. LV ejection fraction   estimated 55 to 60%. Diastolic parameters within normal range. No significant LVH noted. RV is normal in size with normal systolic function. Normal left atrial size. Normal right atrial size. Aortic valve is trileaflet with normal leaflet mobility. No stenosis or   significant regurgitation. Mitral valve is structurally normal with normal leaflet mobility.  No   stenosis or significant regurgitation. Trace to mild tricuspid regurgitation. No significant pericardial effusion. Electronically signed by Wilfredo Cade MD(Interpreting physician)   on 11/20/2022 12:47 PM    11/19/22 cath - Dr. Cory Gomes, branch disease with culprit large OM1 and obstructive   lesions in mid LAD and large second diagonal.   Normal LV systolic function. Successful PCI to OM1 (2.75 x 30 mm resolute integrity taken to 3.0 mm) utilizing drug-eluting stent. Successful PCI to mid LAD (2.75 x 38 mm resolute integrity taken to 3.0  mm) utilizing drug-eluting stent. Successful PCI to the second diagonal (2.5 x 18 mm resolute integrity) utilizing drug-eluting stent. Recommendations    Medical management. Continue Plavix uninterrupted for 6 months. Hypertensive management. Electronically signed by Wilfredo Cade MD(Performing Physician) on   11/19/2022 22:25    Lab Results   Component Value Date    WBC 6.0 12/05/2022    HGB 12.0 (L) 12/05/2022    HCT 37.0 (L) 12/05/2022    MCV 89.6 12/05/2022     12/05/2022     Lab Results   Component Value Date     12/05/2022    K 3.5 12/05/2022     12/05/2022    CO2 26 12/05/2022    BUN 16 12/05/2022    CREATININE 1.1 12/05/2022    GLUCOSE 188 (H) 12/05/2022    CALCIUM 8.7 (L) 12/05/2022    PROT 6.9 12/05/2022    LABALBU 3.6 12/05/2022    BILITOT 0.7 12/05/2022    ALKPHOS 91 12/05/2022    AST 18 12/05/2022    ALT 23 12/05/2022    LABGLOM >60 12/05/2022       Lab Results   Component Value Date    CHOL 207 (H) 11/19/2022     Lab Results   Component Value Date    TRIG 49 11/19/2022     Lab Results   Component Value Date    HDL 41 (L) 11/19/2022     Lab Results   Component Value Date    LDLCALC 156 11/19/2022 12/5/22 pro BNP - 671  12/5/22 urinalysis reviewed.       BP Readings from Last 3 Encounters:   12/09/22 118/70   12/05/22 (!) 150/78   11/25/22 132/71    Pulse Readings from Last 3 Encounters:   12/09/22 63   12/05/22 65   12/05/22 71        Wt Readings from Last 3 Encounters:   12/09/22 210 lb (95.3 kg)   12/05/22 213 lb 3.2 oz (96.7 kg)   11/23/22 203 lb 11.2 oz (92.4 kg)     Assessment/Plan:   Diagnosis Orders   1. Coronary artery disease involving native coronary artery of native heart without angina pectoris        2. History of coronary artery stent placement      11/19/22 PCI procedure:LAD, Diag/Septal1, LEXII, LAD, LEXII, Left Circumflex, OM 1, LEXII      3. Mixed hyperlipidemia        4. Hypertension, unspecified type        5. Benign prostatic hyperplasia with urinary frequency        6. Cystitis        7. Ankle edema, bilateral          CAD - stable on current medical management. Continues on DAPT with no bleeding issues. HTN - normotensive on current regimen. BP today 118/70. Hyperlipidemia - . Now taking Lipitor 80 mg daily. Check labs in 2 months. Bilateral ankle edema - uncertain etiology. EF, creatinine and pro BNP normal range. Patient correlates to new medications (Diovan, metoprolol and Flomax). Concerned about adding diuretic at this point with urinary symptoms. Advised leg elevation and low sodium diet with close follow up in one week. Decreased metoprolol to 1/2 tab daily. Reduced Diovan to once daily. Advised close monitoring of BP. Moderate BPH with urgency, frequency and dysuria - possible cystitis on imaging. Urinalysis clear. Add Omnicef 300 mg BID for 10 days empirically. Pyridium 100 mg TID prn dysuria. Almost out of Flomax, change to Uroxatrol once daily. Contacted urology and patient will be seen at 10:30 today. Patient is compliant with medication regimen. Previous cardiac history and records reviewed. Continue current medical management for cardiac related condition. Continue other current medications as directed. Continue to follow up with primary care provider for non cardiac medical problems.   If your primary care provider is outside of the [facility] please request that your labs be faxed to this office at [phone]. BP goal 130/80 or less. Call the office with any problems, questions or concerns at [phone]. Cardiology follow up as scheduled in [address] appointments. Follow up in one week. Educational included in patient instructions. Heart health.      DOUGIE Ritter

## 2022-12-09 NOTE — PROGRESS NOTES
(1.829 m)   Wt 211 lb (95.7 kg)   BMI 28.62 kg/m²   Physical Exam  Vitals and nursing note reviewed. Constitutional:       General: He is not in acute distress. Appearance: Normal appearance. He is not ill-appearing. Pulmonary:      Effort: Pulmonary effort is normal. No respiratory distress. Abdominal:      General: There is no distension. Tenderness: There is no abdominal tenderness. There is no right CVA tenderness or left CVA tenderness. Genitourinary:     Prostate: Enlarged (75g+, difficult to feel edges of the gland). Neurological:      Mental Status: He is alert and oriented to person, place, and time. Mental status is at baseline. Psychiatric:         Mood and Affect: Mood normal.         Behavior: Behavior normal.       DATA:  Results for orders placed or performed in visit on 12/09/22   POCT Urinalysis no Micro   Result Value Ref Range    Color, UA yellow     Clarity, UA clear     Glucose, UA POC neg     Bilirubin, UA neg     Ketones, UA neg     Spec Grav, UA 1.15     Blood, UA POC neg     pH, UA 7.0     Protein, UA POC neg     Urobilinogen, UA 0.2     Leukocytes, UA neg     Nitrite, UA neg     Appearance, Fluid Clear Clear, Slightly Cloudy       IMAGING:  Impression   1. Mosley balloon in the nearly collapsed bladder. 2. Mild fat stranding around the bladder could be related to cystitis. 3. The mild perinephric fat stranding could be inflammatory and/or postinflammatory scar. 4. No renal calculus; no hydronephrosis. 5. No evidence of small-bowel obstruction. Recommendation: Follow up as clinically indicated. All CT scans at this facility utilize dose modulation, iterative reconstruction, and/or weight based dosing when appropriate to reduce radiation dose to as low as reasonably achievable. Electronically Signed by Jose Antonio Rowan MD at 24-Nov-2022 12:20:29 PM EST           ASSESSMENT/PLAN  1. Difficulty in urination  2. Incomplete bladder emptying  3.  BPH with obstruction/lower urinary tract symptoms  Patient presents as an add-on to cardiology clinic with complaints of difficulty urinating and possible urinary retention. Patient still able to void small amounts on his own, but with significant difficulty. Prostate is quite enlarged on exam.  Unable to feel the full gland. Alpha-blocker therapy currently not working. Complicated case this patient has had a recent PCI intervention and will need dual antiplatelet therapy for quite some time. Would be unable to perform any prostate intervention until able to discontinue blood thinners. I have explained this to the patient and given him the options of being maintained with a chronic indwelling Mosley catheter versus intermittent catheterization. He has chosen to pursue intermittent catheterization. He will keep a voiding diary and follow-up with me in about 2 weeks to determine next steps. - MA Measure, post-void residual, US, non-imaging  - POCT Urinalysis no Micro     At least 60 minutes were spent on the day of the visit reviewing the patient's past medical records/imaging, speaking face to face with the patient, and charting in the post visit period. Return in about 2 weeks (around 12/23/2022). An electronic signature was used to authenticate this note. DOUGIE MUNIZ - CNP    All information inputted into the note by the MA to include chief complaint, past medical history, past surgical history, medications, allergies, social and family history and review of systems has been reviewed and updated as needed by me. EMR Dragon/transcription disclaimer: Much of this document is electronic transcription/translation of spoken language to printed text. The electronic translation of spoken language may be erroneous or, at times, nonsensical words or phrases may be inadvertently transcribed.  Although I have reviewed the document for such errors, some may still exist.

## 2022-12-09 NOTE — PROGRESS NOTES
Discussed with patient in/out cath techniques and sterile techniques. Went over to wash hand with warm soap and water prior to catherizing. Patient consented to let me cath He prior to leaving office for demostration. I was able to drain 550 ml of clear yellow urine from bladder. I gave patient samples of cath and a 16f coude (supplies)  I went over that if patient was getting more than 300 ml each cath time He. He would need to increase the amount of times a day to cath. I gave patient sample box of 16f coude catheters. Patient will call after patient tries catheters at home. Patient was instructed to call back to let office know preference on what catheter He/she (caps): He prefers. I asked the patient to try and cath either first thing in AM and at bedtime after void attempts OR lunch time and bedtime after void attempts. I also gave patient bladder diaries so patient can document how much He. He is draining after voiding attempts. Patient confirmed understanding.

## 2022-12-09 NOTE — PATIENT INSTRUCTIONS
New instructions for today:  Reduce metoprolol 25 mg to 1/2 tab daily. Reduce valsartan to 80 mg 1 tab daily. Stop Flomax (tamsulosin)  Start Uroxatrol (alfusozin) instead one tab daily. Antibiotic  Omnicef 300 mg (1) tab twice daily for 10 days. Pyridum 100 mg (1) tab up to 3 times daily for burning with urination. Elevate legs. Reduce sodium intake. Elevate legs to reduce swelling. Patient Instructions:  Continue current medications as prescribed. Always keep a current medication list. Bring your medications to every office visit. Continue to follow up with primary care provider for non cardiac medical problems. Call the office with any problems, questions or concerns at [phone]. If you have been asked to keep a blood pressure log, do so for 2 weeks. Call the office to report readings to the triage nurse at [phone]. Follow up with cardiologist as scheduled. The following educational material has been included in this after visit summary for your review: Life simple 7. Heart health. Life simple 7  1) Manage blood pressure - high blood pressure is a major risk factor for heart disease and stroke. Keeping blood pressure in health range reduces strain on your heart, arteries and kidneys. Blood pressure goal is less than 130/80. 2) Control cholesterol - contributes to plaque, which can clog arteries and lead to heart disease and stroke. When you control your cholesterol you are giving your arteries their best chance to remain clear. It is recommended that you get cholesterol lab work done once a year. 3) Reduce blood sugar - most of the food we eat is turning into glucose or blood sugar that our body uses for energy. Over time, high levels of blood sugar can damage your heart, kidneys, eyes and nerves. 4) Get active - living an active life is one of the most rewarding gifts you can give yourself and those you love.   Simply put, daily physical activity increases your length and quality of life. Strive to exercise 15 minutes most days of the week. 5)  Eat better - A healthy diet is one of your best weapons for fighting cardiovascular disease. When you eat a heart healthy diet, you improve your chances for feeling good and staying healthy for life. 6)  Lose weight - when you shed extra fat an unnecessary pounds, you reduce the burden on your hear, lungs, blood vessels and skeleton. You give yourself the gift of active living, you lower your blood pressure and help yourself feel better. 7) Stop smoking - cigarette smokers have a higher risk of developing cardiovascular disease. If  You smoke, quitting is the best thing you can do for your health. Check American Heart Association on line for more information on Life's Simple 7 and tips for healthy living. A Healthy Heart: Care Instructions  Your Care Instructions     Coronary artery disease, also called heart disease, occurs when a substance called plaque builds up in the vessels that supply oxygen-rich blood to your heart muscle. This can narrow the blood vessels and reduce blood flow. A heart attack happens when blood flow is completely blocked. A high-fat diet, smoking, and other factors increase the risk of heart disease. Your doctor has found that you have a chance of having heart disease. You can do lots of things to keep your heart healthy. It may not be easy, but you can change your diet, exercise more, and quit smoking. These steps really work to lower your chance of heart disease. Follow-up care is a key part of your treatment and safety. Be sure to make and go to all appointments, and call your doctor if you are having problems. It's also a good idea to know your test results and keep a list of the medicines you take. How can you care for yourself at home? Diet  Use less salt when you cook and eat. This helps lower your blood pressure. Taste food before salting. Add only a little salt when you think you need it. With time, your taste buds will adjust to less salt. Eat fewer snack items, fast foods, canned soups, and other high-salt, high-fat, processed foods. Read food labels and try to avoid saturated and trans fats. They increase your risk of heart disease by raising cholesterol levels. Limit the amount of solid fat-butter, margarine, and shortening-you eat. Use olive, peanut, or canola oil when you cook. Bake, broil, and steam foods instead of frying them. Eat a variety of fruit and vegetables every day. Dark green, deep orange, red, or yellow fruits and vegetables are especially good for you. Examples include spinach, carrots, peaches, and berries. Foods high in fiber can reduce your cholesterol and provide important vitamins and minerals. High-fiber foods include whole-grain cereals and breads, oatmeal, beans, brown rice, citrus fruits, and apples. Eat lean proteins. Heart-healthy proteins include seafood, lean meats and poultry, eggs, beans, peas, nuts, seeds, and soy products. Limit drinks and foods with added sugar. These include candy, desserts, and soda pop. Lifestyle changes  If your doctor recommends it, get more exercise. Walking is a good choice. Bit by bit, increase the amount you walk every day. Try for at least 30 minutes on most days of the week. You also may want to swim, bike, or do other activities. Do not smoke. If you need help quitting, talk to your doctor about stop-smoking programs and medicines. These can increase your chances of quitting for good. Quitting smoking may be the most important step you can take to protect your heart. It is never too late to quit. Limit alcohol to 2 drinks a day for men and 1 drink a day for women. Too much alcohol can cause health problems. Manage other health problems such as diabetes, high blood pressure, and high cholesterol. If you think you may have a problem with alcohol or drug use, talk to your doctor.   Medicines  Take your medicines exactly as prescribed. Call your doctor if you think you are having a problem with your medicine. If your doctor recommends aspirin, take the amount directed each day. Make sure you take aspirin and not another kind of pain reliever, such as acetaminophen (Tylenol). When should you call for help? [id number] if you have symptoms of a heart attack. These may include:  Chest pain or pressure, or a strange feeling in the chest.  Sweating. Shortness of breath. Pain, pressure, or a strange feeling in the back, neck, jaw, or upper belly or in one or both shoulders or arms. Lightheadedness or sudden weakness. A fast or irregular heartbeat. After you call 911, the  may tell you to chew 1 adult-strength or 2 to 4 low-dose aspirin. Wait for an ambulance. Do not try to drive yourself. Watch closely for changes in your health, and be sure to contact your doctor if you have any problems. Where can you learn more? Go to https://[employer]. org and sign in to your [employer] account. Enter M931 in the [place] box to learn more about \"A Healthy Heart: Care Instructions. \"     If you do not have an account, please click on the \"Sign Up Now\" link. Current as of: December 16, 2019               Content Version: 12.5  © 9719-0900 [employer]wise, Incorporated. Care instructions adapted under license by [facility] ([facility]). If you have questions about a medical condition or this instruction, always ask your healthcare professional. [place] [address] any warranty or liability for your use of this information.

## 2022-12-15 ENCOUNTER — OFFICE VISIT (OUTPATIENT)
Dept: CARDIOLOGY CLINIC | Age: 77
End: 2022-12-15
Payer: MEDICARE

## 2022-12-15 VITALS
HEIGHT: 72 IN | OXYGEN SATURATION: 98 % | HEART RATE: 60 BPM | SYSTOLIC BLOOD PRESSURE: 148 MMHG | DIASTOLIC BLOOD PRESSURE: 80 MMHG | WEIGHT: 205 LBS | BODY MASS INDEX: 27.77 KG/M2

## 2022-12-15 DIAGNOSIS — I25.2 HISTORY OF MYOCARDIAL INFARCTION: ICD-10-CM

## 2022-12-15 DIAGNOSIS — I25.10 CORONARY ARTERY DISEASE INVOLVING NATIVE CORONARY ARTERY OF NATIVE HEART WITHOUT ANGINA PECTORIS: Primary | ICD-10-CM

## 2022-12-15 DIAGNOSIS — E78.2 MIXED HYPERLIPIDEMIA: ICD-10-CM

## 2022-12-15 DIAGNOSIS — Z95.5 HISTORY OF CORONARY ARTERY STENT PLACEMENT: ICD-10-CM

## 2022-12-15 DIAGNOSIS — I10 HYPERTENSION, UNSPECIFIED TYPE: ICD-10-CM

## 2022-12-15 PROCEDURE — 3078F DIAST BP <80 MM HG: CPT | Performed by: NURSE PRACTITIONER

## 2022-12-15 PROCEDURE — 99214 OFFICE O/P EST MOD 30 MIN: CPT | Performed by: NURSE PRACTITIONER

## 2022-12-15 PROCEDURE — 3074F SYST BP LT 130 MM HG: CPT | Performed by: NURSE PRACTITIONER

## 2022-12-15 PROCEDURE — 1123F ACP DISCUSS/DSCN MKR DOCD: CPT | Performed by: NURSE PRACTITIONER

## 2022-12-15 RX ORDER — CEFDINIR 300 MG/1
300 CAPSULE ORAL 2 TIMES DAILY
Qty: 10 CAPSULE | Refills: 0 | Status: SHIPPED | OUTPATIENT
Start: 2022-12-15 | End: 2022-12-20

## 2022-12-15 NOTE — PROGRESS NOTES
[facility], [place]. [address], [facility] [address] [facility], [facility]  [phone] office  [phone] fax      OFFICE VISIT:  12/15/2022    Ramón Magallanes - : 1945  Reason For Visit:  Michael Aguilar is a 78 y.o. male who is here for Follow-up and Coronary Artery Disease (Patient was decreased on the Metoprolol to 1/2 tab daily and reduced Diovan to once daily. Patient states there is still some swelling but a little better)    History:   Diagnosis Orders   1. Coronary artery disease involving native coronary artery of native heart without angina pectoris        2. History of coronary artery stent placement      22 cath - PCI procedure:LAD, Diag/Septal1, LEXII, LAD, LEXII, Left Circumflex, OM 1, LEXII - Dr. Marcy Faulkner      3. Hypertension, unspecified type        4. Mixed hyperlipidemia        5. History of myocardial infarction          The patient presents today for cardiology follow up. The patient was seen post MI and stenting on 22. The patient presented to [facility] ED on 22 with elevated troponin levels and chest pain. Subsequently, he underwent cardiac catheterization revealing single-vessel, branch disease with culprit large OM1 and obstructive  lesions in mid LAD and large second diagonal.  Normal LV systolic function. The patient had successful PCI to OM1 (2.75 x 30 mm resolute integrity taken to 3.0 mm) utilizing drug-eluting stent; successful PCI to mid LAD (2.75 x 38 mm resolute integrity taken to 3.0  mm) utilizing drug-eluting stent and successful PCI to the second diagonal (2.5 x 18 mm resolute integrity) utilizing drug-eluting stent. The patient reported on 22 doing well from a cardiac standpoint other then developing bilateral lower extremity edema. His EF was found to be normal.  22 pro BNP was in normal range at 671 and creatinine at 1.1.   The patient presented to ED again on 22 for BERTHA, urinary retention and COVID 19. He was managed with steinberg catheter and IV fluids. A CT showed no hydronephrosis or calculi. The scan showed moderate BP and possible cystitis. Urinalyis showed some hematuria without infection. The patient was started on Flomax at that time. Plavix, Toprol and Diovan were all new medications following the PCI. The patient presented to the ED again on 12/5/22 for pedal edema. He had no chest pain, shortness of breath or PND/orthopnea. The patient reported \"the swelling seemed to coincide with starting Flomax which really has not helped. \" The patient reported the need to void every 10 minutes with burning sensation. He reported no fever or chills. 12/5/22 pro , sodium 140, K 3.5, creatinine 1.1  Urinalysis clear. The patient had a referral appointment with Dr. Myra Lance on 12/18/22. He reports today \"I just cannot wait that long. \"  Dr. Martinez Keenan office was notified on 12/9/22 and the patient was seen that same day. He reports today \"the told me it was an enlarged prostate and I could not have surgery now due to having stents in and being on blood thinner. \"  Medications were adjusted at last visit. Patient is doing much better on Uroxotral versus Flomax. He had completed 5 days of Omnicef which was prescribed empirically for dysuria. He reports frequency and urgency resolved with some ongoing burning with urination. No fever or chills. No flank pain. The edema of legs has mostly resolved and weight is down 5 pounds. The patient requests a release to return to work tomorrow. He does not want to do cardiac rehab. The patient denies symptoms to suggest myocardial ischemia, heart failure or arrhythmia. The patient's PCP monitors cholesterol. BP is elevated today at 148/80. He does not monitor BP at home. Subjective  Jamison denies exertional chest pain, shortness of breath, orthopnea, paroxysmal nocturnal dyspnea, syncope, presyncope, sensed arrhythmia, edema and fatigue. The patient denies numbness or weakness to suggest cerebrovascular accident. ent or transient ischemic attack. + mild bilateral ankle edema.     Ronald Boogie has the following history as recorded  in [facility]:    Patient Active Problem List   Diagnosis Code    NSTEMI (non-ST elevated myocardial infarction) ([facility] [address] I21.4    Elevated troponin R77.8    BERTHA (acute kidney injury) ([facility] [address] N17.9    History of cardiac cath Z98.890    Lab test positive for detection of COVID-19 virus U07.1     Past Medical History:   Diagnosis Date    GERD (gastroesophageal reflux disease)     History of tobacco abuse as teen ager     Myocardial infarction [facility])      Past Surgical History:   Procedure Laterality Date    CORONARY ANGIOPLASTY WITH STENT PLACEMENT  11/19/2022    x3    DIAGNOSTIC CARDIAC CATH LAB PROCEDURE      FOOT SURGERY Right     for a heel fracture     Family History   Problem Relation Age of Onset    Other Mother          of complications of neck surgery when the patient was 11years old    No Known Problems Father         Marcosbuddy Brett seen him 6-8 times in my whole life\"    No Known Problems Sister     No Known Problems Maternal Grandmother     Stroke Maternal Grandfather         at age 61 had a CVA and  within 1-2 years of that    Other Son          in a MVA    No Known Problems Other     No Known Problems Other      Social History     Tobacco Use    Smoking status: Former     Packs/day: 0.25     Years: 2.00     Pack years: 0.50     Types: Cigarettes     Quit date: 1954     Years since quittin.1    Smokeless tobacco: Never    Tobacco comments:     As a teen ager   Substance Use Topics    Alcohol use: Not Currently     Comment: when he was younger socially but not heavily      Current Outpatient Medications   Medication Sig Dispense Refill    cefdinir (OMNICEF) 300 MG capsule Take 1 capsule by mouth 2 times daily for 5 days 10 capsule 0    metoprolol succinate (TOPROL XL) 25 MG extended release tablet Take 0.5 tablets by mouth daily 1 tablet 0    valsartan (DIOVAN) 80 MG tablet Take 1 tablet by mouth daily 1 tablet 0    alfuzosin (UROXATRAL) 10 MG extended release tablet Take 1 tablet by mouth daily 30 tablet 1    Ergocalciferol (VITAMIN D) 82143 units CAPS Take 50,000 Units by mouth once a week 5 capsule 0    aspirin 81 MG EC tablet Take 1 tablet by mouth daily 30 tablet 1    nitroGLYCERIN (NITROSTAT) 0.4 MG SL tablet Place 1 tablet under the tongue every 5 minutes as needed for Chest pain up to max of 3 total doses. If no relief after 1 dose, call 911. 25 tablet 1    atorvastatin (LIPITOR) 80 MG tablet Take 1 tablet by mouth nightly 30 tablet 1    clopidogrel (PLAVIX) 75 MG tablet Take 1 tablet by mouth daily 30 tablet 1    esomeprazole Magnesium (NEXIUM) 20 MG PACK Take 20 mg by mouth as needed       No current facility-administered medications for this visit. Allergies: Patient has no known allergies. Review of Systems  Constitutional - no appetite change, or unexpected weight change. No fever, chills or diaphoresis. No significant change in activity level or new onset of fatigue. HEENT - no significant rhinorrhea or epistaxis. No tinnitus or significant hearing loss. Eyes - no sudden vision change or amaurosis. No corneal arcus, xantholasma, subconjunctival hemorrhage or discharge. Respiratory - no significant wheezing, stridor, apnea or cough. No dyspnea on exertion or shortness of air. Cardiovascular - no exertional chest pain to suggest myocardial ischemia. No orthopnea or PND. No sensation of sustained arrythmia. No occurrence of slow heart rate. No palpitations. No claudication. Gastrointestinal - no abdominal swelling or pain. No blood in stool. No severe constipation, diarrhea, nausea, or vomiting. Genitourinary - no dysuria, frequency, or urgency. No flank pain or hematuria. Musculoskeletal - no back pain or myalgia. No problems with gait.   Extremities - no clubbing or cyanosis. Weight down 5 pounds with mild bilateral ankle edema. Skin - no color change or rash. No pallor. No new surgical incision. Neurologic - no speech difficulty, facial asymmetry or lateralizing weakness. No seizures, presyncope or syncope. No significant dizziness. Hematologic - no easy bruising or excessive bleeding. Psychiatric - no severe anxiety or insomnia. No confusion. All other review of systems are negative. Objective  Vital Signs - BP (!) 148/80   Pulse 60   Ht 6' (1.829 m)   Wt 205 lb (93 kg)   SpO2 98%   BMI 27.80 kg/m²   General - Jamison is alert, cooperative, and pleasant. Well groomed. No acute distress. Body habitus - Body mass index is 27.8 kg/m². HEENT - Head is normocephalic. No circumoral cyanosis. Dentition is normal.  EYES -   Lids normal without ptosis. No discharge, edema or subconjunctival hemorrhage. Neck - Symmetrical without apparent mass or lymphadenopathy. Respiratory - Normal respiratory effort without use of accessory muscles. Ausculatation reveals vesicular breath sounds without crackles, wheezes, rub or rhonchi. Cardiovascular - No jugular venous distention. Auscultation reveals regular rate and rhythm. No audible clicks, gallop or rub. No murmur. No lower extremity varicosities. No carotid bruits. Abdominal -  No visible distention, mass or pulsations. Extremities - No clubbing or cyanosis. No statis dermatitis or ulcers. Trace bilateral ankle edema. Musculoskeletal -   No Osler's nodes. No kyphosis or scoliosis. Gait is even and regular without limp or shuffle. Ambulates without assistance. Skin -  Warm and dry; no rash or pallor. No new surgical wound. Neurological - No focal neurological deficits. Thought processes coherent. No apparent tremor. Oriented to person, place and time. Psychiatric -  Appropriate affect and mood.      Data reviewed:  11/19/22 echo   LV is normal in size with normal systolic function. LV ejection fraction   estimated 55 to 60%. Diastolic parameters within normal range. No significant LVH noted. RV is normal in size with normal systolic function. Normal left atrial size. Normal right atrial size. Aortic valve is trileaflet with normal leaflet mobility. No stenosis or   significant regurgitation. Mitral valve is structurally normal with normal leaflet mobility. No   stenosis or significant regurgitation. Trace to mild tricuspid regurgitation. No significant pericardial effusion. Electronically signed by Jesus Cade MD(Interpreting physician)   on 11/20/2022 12:47 PM    11/19/22 cath - Dr. Suki Romeo, branch disease with culprit large OM1 and obstructive   lesions in mid LAD and large second diagonal.   Normal LV systolic function. Successful PCI to OM1 (2.75 x 30 mm resolute integrity taken to 3.0 mm) utilizing drug-eluting stent. Successful PCI to mid LAD (2.75 x 38 mm resolute integrity taken to 3.0  mm) utilizing drug-eluting stent. Successful PCI to the second diagonal (2.5 x 18 mm resolute integrity) utilizing drug-eluting stent. Recommendations    Medical management. Continue Plavix uninterrupted for 6 months. Hypertensive management.    Electronically signed by Jesus Cade MD(Performing Physician) on   11/19/2022 22:25    Lab Results   Component Value Date    WBC 6.0 12/05/2022    HGB 12.0 (L) 12/05/2022    HCT 37.0 (L) 12/05/2022    MCV 89.6 12/05/2022     12/05/2022     Lab Results   Component Value Date     12/05/2022    K 3.5 12/05/2022     12/05/2022    CO2 26 12/05/2022    BUN 16 12/05/2022    CREATININE 1.1 12/05/2022    GLUCOSE 188 (H) 12/05/2022    CALCIUM 8.7 (L) 12/05/2022    PROT 6.9 12/05/2022    LABALBU 3.6 12/05/2022    BILITOT 0.7 12/05/2022    ALKPHOS 91 12/05/2022    AST 18 12/05/2022    ALT 23 12/05/2022    LABGLOM >60 12/05/2022       Lab Results   Component Value Date    CHOL 207 (H) 11/19/2022     Lab Results   Component Value Date    TRIG 49 11/19/2022     Lab Results   Component Value Date    HDL 41 (L) 11/19/2022     Lab Results   Component Value Date    LDLCALC 156 11/19/2022     BP Readings from Last 3 Encounters:   12/15/22 (!) 148/80   12/09/22 118/70   12/05/22 (!) 150/78    Pulse Readings from Last 3 Encounters:   12/15/22 60   12/09/22 63   12/05/22 65        Wt Readings from Last 3 Encounters:   12/15/22 205 lb (93 kg)   12/09/22 211 lb (95.7 kg)   12/09/22 210 lb (95.3 kg)     Assessment/Plan:   Diagnosis Orders   1. Coronary artery disease involving native coronary artery of native heart without angina pectoris        2. History of coronary artery stent placement      11/19/22 cath - PCI procedure:LAD, Diag/Septal1, LEXII, LAD, LEXII, Left Circumflex, OM 1, LEXII - Dr. Clarisa Hernandez      3. Hypertension, unspecified type        4. Mixed hyperlipidemia        5. History of myocardial infarction          Stable CV status without overt heart failure, sensed arrhythmia or angina. CAD s/p stenting - stable on current medical management. Patient declined cardiac rehab. Requests work release without restrictions. Letter provided. HTN - BP elevated today at 148/80. Advised home BP monitoring with goal less than 130/80. Patient will call back with persistent elevations. Hyperlipidemia - Continues on Lipitor 80 mg daily. Hx of lower extremity edema - mostly resolved after medications changes done at last visit. Hx BPH - patient was seen by urology last week. Improved urinary symptoms on Uroxatrol and decreased beta blocker dose. Patient is compliant with medication regimen. Previous cardiac history and records reviewed. Continue current medical management for cardiac related condition. Continue other current medications as directed. Continue to follow up with primary care provider for non cardiac medical problems.   If your primary care provider is outside of the [facility], please request that your labs be faxed to this office at [phone]. BP goal 130/80 or less. Call the office with any problems, questions or concerns at [phone]. Cardiology follow up as scheduled in [place] appointments. Educational included in patient instructions. Heart health.       Paul Elam, APRN

## 2022-12-15 NOTE — PATIENT INSTRUCTIONS
New instructions for today:  Monitor your blood pressure twice daily and keep a log. Your blood pressure goal is 130/80 or less. We will discuss in 2 weeks by either scheduled telephone encounter or patient call back. Office phone number [phone]. Patient Instructions:  Continue current medications as prescribed. Always keep a current medication list. Bring your medications to every office visit. Continue to follow up with primary care provider for non cardiac medical problems. Call the office with any problems, questions or concerns at [phone]. If you have been asked to keep a blood pressure log, do so for 2 weeks. Call the office to report readings to the triage nurse at [phone]. Follow up with cardiologist as scheduled. The following educational material has been included in this after visit summary for your review: Life simple 7. Heart health. Life simple 7  1) Manage blood pressure - high blood pressure is a major risk factor for heart disease and stroke. Keeping blood pressure in health range reduces strain on your heart, arteries and kidneys. Blood pressure goal is less than 130/80. 2) Control cholesterol - contributes to plaque, which can clog arteries and lead to heart disease and stroke. When you control your cholesterol you are giving your arteries their best chance to remain clear. It is recommended that you get cholesterol lab work done once a year. 3) Reduce blood sugar - most of the food we eat is turning into glucose or blood sugar that our body uses for energy. Over time, high levels of blood sugar can damage your heart, kidneys, eyes and nerves. 4) Get active - living an active life is one of the most rewarding gifts you can give yourself and those you love. Simply put, daily physical activity increases your length and quality of life. Strive to exercise 15 minutes most days of the week.   5)  Eat better - A healthy diet is one of your best weapons for fighting cardiovascular disease. When you eat a heart healthy diet, you improve your chances for feeling good and staying healthy for life. 6)  Lose weight - when you shed extra fat an unnecessary pounds, you reduce the burden on your hear, lungs, blood vessels and skeleton. You give yourself the gift of active living, you lower your blood pressure and help yourself feel better. 7) Stop smoking - cigarette smokers have a higher risk of developing cardiovascular disease. If  You smoke, quitting is the best thing you can do for your health. Check American Heart Association on line for more information on Life's Simple 7 and tips for healthy living. A Healthy Heart: Care Instructions  Your Care Instructions     Coronary artery disease, also called heart disease, occurs when a substance called plaque builds up in the vessels that supply oxygen-rich blood to your heart muscle. This can narrow the blood vessels and reduce blood flow. A heart attack happens when blood flow is completely blocked. A high-fat diet, smoking, and other factors increase the risk of heart disease. Your doctor has found that you have a chance of having heart disease. You can do lots of things to keep your heart healthy. It may not be easy, but you can change your diet, exercise more, and quit smoking. These steps really work to lower your chance of heart disease. Follow-up care is a key part of your treatment and safety. Be sure to make and go to all appointments, and call your doctor if you are having problems. It's also a good idea to know your test results and keep a list of the medicines you take. How can you care for yourself at home? Diet  Use less salt when you cook and eat. This helps lower your blood pressure. Taste food before salting. Add only a little salt when you think you need it. With time, your taste buds will adjust to less salt.   Eat fewer snack items, fast foods, canned soups, and other high-salt, high-fat, processed foods. Read food labels and try to avoid saturated and trans fats. They increase your risk of heart disease by raising cholesterol levels. Limit the amount of solid fat-butter, margarine, and shortening-you eat. Use olive, peanut, or canola oil when you cook. Bake, broil, and steam foods instead of frying them. Eat a variety of fruit and vegetables every day. Dark green, deep orange, red, or yellow fruits and vegetables are especially good for you. Examples include spinach, carrots, peaches, and berries. Foods high in fiber can reduce your cholesterol and provide important vitamins and minerals. High-fiber foods include whole-grain cereals and breads, oatmeal, beans, brown rice, citrus fruits, and apples. Eat lean proteins. Heart-healthy proteins include seafood, lean meats and poultry, eggs, beans, peas, nuts, seeds, and soy products. Limit drinks and foods with added sugar. These include candy, desserts, and soda pop. Lifestyle changes  If your doctor recommends it, get more exercise. Walking is a good choice. Bit by bit, increase the amount you walk every day. Try for at least 30 minutes on most days of the week. You also may want to swim, bike, or do other activities. Do not smoke. If you need help quitting, talk to your doctor about stop-smoking programs and medicines. These can increase your chances of quitting for good. Quitting smoking may be the most important step you can take to protect your heart. It is never too late to quit. Limit alcohol to 2 drinks a day for men and 1 drink a day for women. Too much alcohol can cause health problems. Manage other health problems such as diabetes, high blood pressure, and high cholesterol. If you think you may have a problem with alcohol or drug use, talk to your doctor. Medicines  Take your medicines exactly as prescribed. Call your doctor if you think you are having a problem with your medicine.   If your doctor recommends aspirin, take the

## 2022-12-18 ASSESSMENT — ENCOUNTER SYMPTOMS
ABDOMINAL PAIN: 0
CHEST TIGHTNESS: 0
VOMITING: 0
DIARRHEA: 0
NAUSEA: 0
SHORTNESS OF BREATH: 1

## 2022-12-19 PROBLEM — R79.89 ELEVATED TROPONIN: Status: RESOLVED | Noted: 2022-11-19 | Resolved: 2022-12-19

## 2022-12-19 PROBLEM — R77.8 ELEVATED TROPONIN: Status: RESOLVED | Noted: 2022-11-19 | Resolved: 2022-12-19

## 2023-01-23 RX ORDER — NITROGLYCERIN 0.4 MG/1
0.4 TABLET SUBLINGUAL EVERY 5 MIN PRN
Qty: 25 TABLET | Refills: 3 | Status: SHIPPED | OUTPATIENT
Start: 2023-01-23

## 2023-01-23 RX ORDER — ASPIRIN 81 MG/1
81 TABLET ORAL DAILY
Qty: 30 TABLET | Refills: 3 | Status: SHIPPED | OUTPATIENT
Start: 2023-01-23

## 2023-01-23 RX ORDER — ATORVASTATIN CALCIUM 80 MG/1
80 TABLET, FILM COATED ORAL NIGHTLY
Qty: 30 TABLET | Refills: 3 | Status: SHIPPED | OUTPATIENT
Start: 2023-01-23

## 2023-01-23 RX ORDER — CLOPIDOGREL BISULFATE 75 MG/1
75 TABLET ORAL DAILY
Qty: 30 TABLET | Refills: 3 | Status: SHIPPED | OUTPATIENT
Start: 2023-01-23

## 2023-01-23 RX ORDER — METOPROLOL SUCCINATE 25 MG/1
12.5 TABLET, EXTENDED RELEASE ORAL DAILY
Qty: 30 TABLET | Refills: 3 | Status: SHIPPED | OUTPATIENT
Start: 2023-01-23

## 2023-01-23 RX ORDER — VALSARTAN 80 MG/1
80 TABLET ORAL DAILY
Qty: 30 TABLET | Refills: 3 | Status: SHIPPED | OUTPATIENT
Start: 2023-01-23

## 2023-02-03 RX ORDER — ALFUZOSIN HYDROCHLORIDE 10 MG/1
TABLET, EXTENDED RELEASE ORAL
Qty: 30 TABLET | Refills: 1 | OUTPATIENT
Start: 2023-02-03

## 2023-02-14 ENCOUNTER — TELEPHONE (OUTPATIENT)
Dept: CARDIOLOGY CLINIC | Age: 78
End: 2023-02-14

## 2023-02-14 NOTE — TELEPHONE ENCOUNTER
Returned patient's call, he was inquiring about his alfuzosin being denied. Advised patient this is not a cardiac medication and not one that we typically fill. I advised it looked like Roberta Cohen has filled back in December to get him through until he found a pcp, patient still has not attempted to get a pcp. Advised patient this is not a medication we manage long term and it ws a short supply to help him find a pcp. Advised patient he will need to get a pcp or urologist to manage. Patient verbally understood.

## 2023-03-15 ENCOUNTER — OFFICE VISIT (OUTPATIENT)
Dept: CARDIOLOGY CLINIC | Age: 78
End: 2023-03-15
Payer: MEDICARE

## 2023-03-15 VITALS
SYSTOLIC BLOOD PRESSURE: 136 MMHG | WEIGHT: 207 LBS | DIASTOLIC BLOOD PRESSURE: 70 MMHG | OXYGEN SATURATION: 98 % | HEART RATE: 56 BPM | BODY MASS INDEX: 28.04 KG/M2 | HEIGHT: 72 IN

## 2023-03-15 DIAGNOSIS — Z95.5 HISTORY OF CORONARY ARTERY STENT PLACEMENT: ICD-10-CM

## 2023-03-15 DIAGNOSIS — I10 HYPERTENSION, UNSPECIFIED TYPE: ICD-10-CM

## 2023-03-15 DIAGNOSIS — I25.10 CORONARY ARTERY DISEASE INVOLVING NATIVE CORONARY ARTERY OF NATIVE HEART WITHOUT ANGINA PECTORIS: Primary | ICD-10-CM

## 2023-03-15 DIAGNOSIS — E78.2 MIXED HYPERLIPIDEMIA: ICD-10-CM

## 2023-03-15 PROCEDURE — 99214 OFFICE O/P EST MOD 30 MIN: CPT | Performed by: NURSE PRACTITIONER

## 2023-03-15 PROCEDURE — 3075F SYST BP GE 130 - 139MM HG: CPT | Performed by: NURSE PRACTITIONER

## 2023-03-15 PROCEDURE — 1123F ACP DISCUSS/DSCN MKR DOCD: CPT | Performed by: NURSE PRACTITIONER

## 2023-03-15 PROCEDURE — 3078F DIAST BP <80 MM HG: CPT | Performed by: NURSE PRACTITIONER

## 2023-03-15 NOTE — PROGRESS NOTES
[facility], [place]. [address] [facility]  [phone] office  [phone] fax      OFFICE VISIT:  3/15/2023    Delvis Bragg - : 1945  Reason For Visit:  Ezequiel Musa is a 78 y.o. male who is here for Follow-Up from Hospital (4 month post heart cath with stents. Patient denies any cardiac symptoms. ) and Coronary Artery Disease    History:   Diagnosis Orders   1. Coronary artery disease involving native coronary artery of native heart without angina pectoris        2. History of coronary artery stent placement      PCI procedure:LAD, Diag/Septal1, LEXII, LAD, LEXII, Left Circumflex, OM 1, LEXII         3. Hypertension, unspecified type        4. Mixed hyperlipidemia          The patient presents today for cardiology follow up. The patient is doing well without chest pain, shortness of breath or decline in activity tolerance. He underwent a cath on 22 by Dr. Betsy Temple revealing single-vessel, branch disease with culprit large OM1 and obstructive  lesions in mid LAD and large second diagonal. Normal LV systolic function. Successful PCI to OM1 (2.75 x 30 mm resolute integrity taken to 3.0 mm) utilizing drug-eluting stent. Successful PCI to mid LAD (2.75 x 38 mm resolute integrity taken to 3.0  mm) utilizing drug-eluting stent. Successful PCI to the second diagonal (2.5 x 18 mm resolute integrity)  utilizing drug-eluting stent. The patient denies symptoms to suggest myocardial ischemia, heart failure or arrhythmia. He continues on Plavix and ASA without report of bleeding issues. BP is well controlled on current regimen. The patient continues on Lipitor 80 mg daily. LDL at time of cath was 156. The patient is due for labs. He does not currently smoke.      Subjective  Jamison denies exertional chest pain, shortness of breath, orthopnea, paroxysmal nocturnal dyspnea, syncope, presyncope, sensed arrhythmia, edema and fatigue. The patient denies numbness or weakness to suggest cerebrovascular accident or transient ischemic attack.       Martha Cardenas has the following history as recorded in [facility]:  Patient Active Problem List   Diagnosis Code    NSTEMI (non-ST elevated myocardial infarction) ([facility] [address] I21.4    BERTHA (acute kidney injury) ([facility] [address] N17.9    History of cardiac cath Z98.890    Lab test positive for detection of COVID-19 virus U07.1     Past Medical History:   Diagnosis Date    GERD (gastroesophageal reflux disease)     History of tobacco abuse as teen ager     Myocardial infarction [facility])      Past Surgical History:   Procedure Laterality Date    CORONARY ANGIOPLASTY WITH STENT PLACEMENT  11/19/2022    x3    DIAGNOSTIC CARDIAC CATH LAB PROCEDURE      FOOT SURGERY Right     for a heel fracture     Family History   Problem Relation Age of Onset    Other Mother          of complications of neck surgery when the patient was 11years old    No Known Problems Father         Eleazar Armendariz seen him 6-8 times in my whole life\"    No Known Problems Sister     No Known Problems Maternal Grandmother     Stroke Maternal Grandfather         at age 2617 [facility] had a CVA and  within 1-2 years of that    Other Son          in a MVA    No Known Problems Other     No Known Problems Other      Social History     Tobacco Use    Smoking status: Former     Packs/day: 0.25     Years: 2.00     Pack years: 0.50     Types: Cigarettes     Quit date: 1954     Years since quittin.3    Smokeless tobacco: Never    Tobacco comments:     As a teen ager   Substance Use Topics    Alcohol use: Not Currently     Comment: when he was younger socially but not heavily      Current Outpatient Medications   Medication Sig Dispense Refill    metoprolol succinate (TOPROL XL) 25 MG extended release tablet Take 0.5 tablets by mouth daily 30 tablet 3    valsartan (DIOVAN) 80 MG tablet Take 1 tablet by mouth daily 30 tablet 3    aspirin 81 MG EC tablet Take 1 tablet by mouth daily 30 tablet 3    nitroGLYCERIN (NITROSTAT) 0.4 MG SL tablet Place 1 tablet under the tongue every 5 minutes as needed for Chest pain up to max of 3 total doses. If no relief after 1 dose, call 911. 25 tablet 3    atorvastatin (LIPITOR) 80 MG tablet Take 1 tablet by mouth nightly 30 tablet 3    clopidogrel (PLAVIX) 75 MG tablet Take 1 tablet by mouth daily 30 tablet 3    Ergocalciferol (VITAMIN D) 36683 units CAPS Take 50,000 Units by mouth once a week 5 capsule 0    esomeprazole Magnesium (NEXIUM) 20 MG PACK Take 20 mg by mouth as needed       No current facility-administered medications for this visit. Allergies: Patient has no known allergies. Review of Systems  Constitutional - no appetite change, or unexpected weight change. No fever, chills or diaphoresis. No significant change in activity level or new onset of fatigue. HEENT - no significant rhinorrhea or epistaxis. No tinnitus or significant hearing loss. Eyes - no sudden vision change or amaurosis. No corneal arcus, xantholasma, subconjunctival hemorrhage or discharge. Respiratory - no significant wheezing, stridor, apnea or cough. No dyspnea on exertion or shortness of air. Cardiovascular - no exertional chest pain to suggest myocardial ischemia. No orthopnea or PND. No sensation of sustained arrythmia. No occurrence of slow heart rate. No palpitations. No claudication. Gastrointestinal - no abdominal swelling or pain. No blood in stool. No severe constipation, diarrhea, nausea, or vomiting. Genitourinary - no dysuria, frequency, or urgency. No flank pain or hematuria. Musculoskeletal - no back pain or myalgia. No problems with gait. Extremities - no clubbing, cyanosis or extremity edema. Skin - no color change or rash. No pallor. No new surgical incision. Neurologic - no speech difficulty, facial asymmetry or lateralizing weakness. No seizures, presyncope or syncope.   No significant dizziness. Hematologic - no easy bruising or excessive bleeding. Psychiatric - no severe anxiety or insomnia. No confusion. All other review of systems are negative. Objective  Vital Signs - /70   Pulse 56   Ht 6' (1.829 m)   Wt 207 lb (93.9 kg)   SpO2 98%   BMI 28.07 kg/m²   General - Jamison is alert, cooperative, and pleasant. Well groomed. No acute distress. Body habitus - Body mass index is 28.07 kg/m². HEENT - Head is normocephalic. No circumoral cyanosis. Dentition is normal.  EYES -   Lids normal without ptosis. No discharge, edema or subconjunctival hemorrhage. Neck - Symmetrical without apparent mass or lymphadenopathy. Respiratory - Normal respiratory effort without use of accessory muscles. Ausculatation reveals vesicular breath sounds without crackles, wheezes, rub or rhonchi. Cardiovascular - No jugular venous distention. Auscultation reveals regular rate and rhythm. No audible clicks, gallop or rub. No murmur. No lower extremity varicosities. No carotid bruits. Abdominal -  No visible distention, mass or pulsations. Extremities - No clubbing or cyanosis. No statis dermatitis or ulcers. No edema. Musculoskeletal -   No Osler's nodes. No kyphosis or scoliosis. Gait is even and regular without limp or shuffle. Ambulates without assistance. Skin -  Warm and dry; no rash or pallor. No new surgical wound. Neurological - No focal neurological deficits. Thought processes coherent. No apparent tremor. Oriented to person, place and time. Psychiatric -  Appropriate affect and mood. Data reviewed:  11/19/22 echo  LV is normal in size with normal systolic function. LV ejection fraction estimated 55 to 60%. Diastolic parameters within normal range. No significant LVH noted. RV is normal in size with normal systolic function. Normal left atrial size. Normal right atrial size. Aortic valve is trileaflet with normal leaflet mobility.  No stenosis or   significant regurgitation. Mitral valve is structurally normal with normal leaflet mobility. No   stenosis or significant regurgitation. Trace to mild tricuspid regurgitation. No significant pericardial effusion. Electronically signed by Yolonda Homans Mani,MD(Interpreting physician)   on 11/20/2022 12:47 PM    11/19/22 cath - Dr. Chino [facility], branch disease with culprit large OM1 and obstructive lesions in mid LAD and large second diagonal.   Normal LV systolic function. Successful PCI to OM1 (2.75 x 30 mm resolute integrity taken to 3.0 mm) utilizing drug-eluting stent. Successful PCI to mid LAD (2.75 x 38 mm resolute integrity taken to 3.0 mm) utilizing drug-eluting stent. Successful PCI to the second diagonal (2.5 x 18 mm resolute integrity) utilizing drug-eluting stent. Recommendations    Medical management. Continue Plavix uninterrupted for 6 months. Hypertensive management.    Electronically signed by Yolonda Homans Mani,MD(Performing Physician) on   11/19/2022 22:25    Lab Results   Component Value Date    WBC 6.0 12/05/2022    HGB 12.0 (L) 12/05/2022    HCT 37.0 (L) 12/05/2022    MCV 89.6 12/05/2022     12/05/2022     Lab Results   Component Value Date     12/05/2022    K 3.5 12/05/2022     12/05/2022    CO2 26 12/05/2022    BUN 16 12/05/2022    CREATININE 1.1 12/05/2022    GLUCOSE 188 (H) 12/05/2022    CALCIUM 8.7 (L) 12/05/2022    PROT 6.9 12/05/2022    LABALBU 3.6 12/05/2022    BILITOT 0.7 12/05/2022    ALKPHOS 91 12/05/2022    AST 18 12/05/2022    ALT 23 12/05/2022    LABGLOM >60 12/05/2022     Lab Results   Component Value Date    CHOL 207 (H) 11/19/2022     Lab Results   Component Value Date    TRIG 49 11/19/2022     Lab Results   Component Value Date    HDL 41 (L) 11/19/2022     Lab Results   Component Value Date    LDLCALC 156 11/19/2022     BP Readings from Last 3 Encounters:   03/15/23 136/70   12/15/22 (!) 148/80   12/09/22 118/70    Pulse Readings from Last 3 Encounters:   03/15/23 56   12/15/22 60   12/09/22 63        Wt Readings from Last 3 Encounters:   03/15/23 207 lb (93.9 kg)   12/15/22 205 lb (93 kg)   12/09/22 211 lb (95.7 kg)     Assessment/Plan:   Diagnosis Orders   1. Coronary artery disease involving native coronary artery of native heart without angina pectoris        2. History of coronary artery stent placement      PCI procedure:LAD, Diag/Septal1, LEXII, LAD, LEXII, Left Circumflex, OM 1, LEXII         3. Hypertension, unspecified type        4. Mixed hyperlipidemia          Stable CV status without overt heart failure, sensed arrhythmia or angina. CAD s/p stenting - stable on current medical management to include Toprol XL, Diovan, Lipitor, Plavix and ASA. Continue same. HTN - normotensive on current regimen. BP today 136/70. Continue same. Hyperlipidemia - continues on Lipitor 80 mg daily. Due for labs. Lab orders - lipid panel, ALT and AST. Patient is compliant with medication regimen. Previous cardiac history and records reviewed. Continue current medical management for cardiac related condition. Continue other current medications as directed. Continue to follow up with primary care provider for non cardiac medical problems. If your primary care provider is outside of the [facility], please request that your labs be faxed to this office at [phone]. BP goal 130/80 or less. Call the office with any problems, questions or concerns at [phone]. Cardiology follow up as scheduled in [address] appointments. Educational included in patient instructions. Heart health.       DOUGIE Mcmullen

## 2023-03-15 NOTE — PATIENT INSTRUCTIONS
New instructions for today:  You have been given orders for a cholesterol check. You will need to fast after midnight. You may have black coffee, diet soda or water before the test.  You may take your medications before the test.    Patient Instructions:  Continue current medications as prescribed. Always keep a current medication list. Bring your medications to every office visit. Continue to follow up with primary care provider for non cardiac medical problems. Call the office with any problems, questions or concerns at [phone]. If you have been asked to keep a blood pressure log, do so for 2 weeks. Call the office to report readings to the triage nurse at [phone]. Follow up with cardiologist as scheduled. The following educational material has been included in this after visit summary for your review: Life simple 7. Heart health. Life simple 7  1) Manage blood pressure - high blood pressure is a major risk factor for heart disease and stroke. Keeping blood pressure in health range reduces strain on your heart, arteries and kidneys. Blood pressure goal is less than 130/80. 2) Control cholesterol - contributes to plaque, which can clog arteries and lead to heart disease and stroke. When you control your cholesterol you are giving your arteries their best chance to remain clear. It is recommended that you get cholesterol lab work done once a year. 3) Reduce blood sugar - most of the food we eat is turning into glucose or blood sugar that our body uses for energy. Over time, high levels of blood sugar can damage your heart, kidneys, eyes and nerves. 4) Get active - living an active life is one of the most rewarding gifts you can give yourself and those you love. Simply put, daily physical activity increases your length and quality of life. Strive to exercise 15 minutes most days of the week.   5)  Eat better - A healthy diet is one of your best weapons for fighting cardiovascular disease. When you eat a heart healthy diet, you improve your chances for feeling good and staying healthy for life. 6)  Lose weight - when you shed extra fat an unnecessary pounds, you reduce the burden on your hear, lungs, blood vessels and skeleton. You give yourself the gift of active living, you lower your blood pressure and help yourself feel better. 7) Stop smoking - cigarette smokers have a higher risk of developing cardiovascular disease. If  You smoke, quitting is the best thing you can do for your health. Check American Heart Association on line for more information on Life's Simple 7 and tips for healthy living. A Healthy Heart: Care Instructions  Your Care Instructions     Coronary artery disease, also called heart disease, occurs when a substance called plaque builds up in the vessels that supply oxygen-rich blood to your heart muscle. This can narrow the blood vessels and reduce blood flow. A heart attack happens when blood flow is completely blocked. A high-fat diet, smoking, and other factors increase the risk of heart disease. Your doctor has found that you have a chance of having heart disease. You can do lots of things to keep your heart healthy. It may not be easy, but you can change your diet, exercise more, and quit smoking. These steps really work to lower your chance of heart disease. Follow-up care is a key part of your treatment and safety. Be sure to make and go to all appointments, and call your doctor if you are having problems. It's also a good idea to know your test results and keep a list of the medicines you take. How can you care for yourself at home? Diet  Use less salt when you cook and eat. This helps lower your blood pressure. Taste food before salting. Add only a little salt when you think you need it. With time, your taste buds will adjust to less salt. Eat fewer snack items, fast foods, canned soups, and other high-salt, high-fat, processed foods.   Read food labels and try to avoid saturated and trans fats. They increase your risk of heart disease by raising cholesterol levels. Limit the amount of solid fat-butter, margarine, and shortening-you eat. Use olive, peanut, or canola oil when you cook. Bake, broil, and steam foods instead of frying them. Eat a variety of fruit and vegetables every day. Dark green, deep orange, red, or yellow fruits and vegetables are especially good for you. Examples include spinach, carrots, peaches, and berries. Foods high in fiber can reduce your cholesterol and provide important vitamins and minerals. High-fiber foods include whole-grain cereals and breads, oatmeal, beans, brown rice, citrus fruits, and apples. Eat lean proteins. Heart-healthy proteins include seafood, lean meats and poultry, eggs, beans, peas, nuts, seeds, and soy products. Limit drinks and foods with added sugar. These include candy, desserts, and soda pop. Lifestyle changes  If your doctor recommends it, get more exercise. Walking is a good choice. Bit by bit, increase the amount you walk every day. Try for at least 30 minutes on most days of the week. You also may want to swim, bike, or do other activities. Do not smoke. If you need help quitting, talk to your doctor about stop-smoking programs and medicines. These can increase your chances of quitting for good. Quitting smoking may be the most important step you can take to protect your heart. It is never too late to quit. Limit alcohol to 2 drinks a day for men and 1 drink a day for women. Too much alcohol can cause health problems. Manage other health problems such as diabetes, high blood pressure, and high cholesterol. If you think you may have a problem with alcohol or drug use, talk to your doctor. Medicines  Take your medicines exactly as prescribed. Call your doctor if you think you are having a problem with your medicine. If your doctor recommends aspirin, take the amount directed each day.  Make sure you take aspirin and not another kind of pain reliever, such as acetaminophen (Tylenol). When should you call for help? [id number] if you have symptoms of a heart attack. These may include:  Chest pain or pressure, or a strange feeling in the chest.  Sweating. Shortness of breath. Pain, pressure, or a strange feeling in the back, neck, jaw, or upper belly or in one or both shoulders or arms. Lightheadedness or sudden weakness. A fast or irregular heartbeat. After you call 911, the  may tell you to chew 1 adult-strength or 2 to 4 low-dose aspirin. Wait for an ambulance. Do not try to drive yourself. Watch closely for changes in your health, and be sure to contact your doctor if you have any problems. Where can you learn more? Go to https://[employer].Dealflicks. org and sign in to your [employer] account. Enter C013 in the [employer] box to learn more about \"A Healthy Heart: Care Instructions. \"     If you do not have an account, please click on the \"Sign Up Now\" link. Current as of: December 16, 2019               Content Version: 12.5  © 4776-3837 [employer], [employer]. Care instructions adapted under license by [facility] [address] If you have questions about a medical condition or this instruction, always ask your healthcare professional. [address] any warranty or liability for your use of this information.

## 2023-05-26 RX ORDER — CLOPIDOGREL BISULFATE 75 MG/1
TABLET ORAL
Qty: 30 TABLET | Refills: 3 | Status: SHIPPED | OUTPATIENT
Start: 2023-05-26

## 2023-05-26 RX ORDER — ATORVASTATIN CALCIUM 80 MG/1
80 TABLET, FILM COATED ORAL NIGHTLY
Qty: 30 TABLET | Refills: 0 | Status: SHIPPED | OUTPATIENT
Start: 2023-05-26

## 2023-06-23 RX ORDER — VALSARTAN 80 MG/1
TABLET ORAL
Qty: 30 TABLET | Refills: 3 | Status: SHIPPED | OUTPATIENT
Start: 2023-06-23

## 2023-06-26 RX ORDER — ATORVASTATIN CALCIUM 80 MG/1
80 TABLET, FILM COATED ORAL NIGHTLY
Qty: 30 TABLET | Refills: 4 | Status: SHIPPED | OUTPATIENT
Start: 2023-06-26

## 2023-09-14 ENCOUNTER — TELEPHONE (OUTPATIENT)
Dept: CARDIOLOGY CLINIC | Age: 78
End: 2023-09-14

## 2023-09-25 RX ORDER — METOPROLOL SUCCINATE 25 MG/1
12.5 TABLET, EXTENDED RELEASE ORAL DAILY
Qty: 30 TABLET | Refills: 5 | Status: SHIPPED | OUTPATIENT
Start: 2023-09-25

## 2023-12-14 RX ORDER — CLOPIDOGREL BISULFATE 75 MG/1
TABLET ORAL
Qty: 30 TABLET | Refills: 5 | Status: SHIPPED | OUTPATIENT
Start: 2023-12-14

## 2024-02-05 RX ORDER — ATORVASTATIN CALCIUM 80 MG/1
80 TABLET, FILM COATED ORAL NIGHTLY
Qty: 90 TABLET | Refills: 1 | OUTPATIENT
Start: 2024-02-05

## 2024-03-14 RX ORDER — ATORVASTATIN CALCIUM 80 MG/1
80 TABLET, FILM COATED ORAL NIGHTLY
Qty: 90 TABLET | Refills: 1 | OUTPATIENT
Start: 2024-03-14

## 2024-06-05 RX ORDER — CLOPIDOGREL BISULFATE 75 MG/1
TABLET ORAL
Qty: 30 TABLET | Refills: 5 | OUTPATIENT
Start: 2024-06-05

## 2024-07-29 ENCOUNTER — OFFICE VISIT (OUTPATIENT)
Dept: CARDIOLOGY CLINIC | Age: 79
End: 2024-07-29
Payer: MEDICARE

## 2024-07-29 VITALS
HEIGHT: 72 IN | DIASTOLIC BLOOD PRESSURE: 72 MMHG | BODY MASS INDEX: 27.09 KG/M2 | SYSTOLIC BLOOD PRESSURE: 124 MMHG | WEIGHT: 200 LBS | OXYGEN SATURATION: 98 % | HEART RATE: 55 BPM

## 2024-07-29 DIAGNOSIS — E78.2 MIXED HYPERLIPIDEMIA: ICD-10-CM

## 2024-07-29 DIAGNOSIS — I10 HYPERTENSION, UNSPECIFIED TYPE: ICD-10-CM

## 2024-07-29 DIAGNOSIS — I25.10 CORONARY ARTERY DISEASE INVOLVING NATIVE CORONARY ARTERY OF NATIVE HEART WITHOUT ANGINA PECTORIS: ICD-10-CM

## 2024-07-29 DIAGNOSIS — I25.10 CORONARY ARTERY DISEASE INVOLVING NATIVE CORONARY ARTERY OF NATIVE HEART WITHOUT ANGINA PECTORIS: Primary | ICD-10-CM

## 2024-07-29 DIAGNOSIS — Z95.5 HISTORY OF CORONARY ARTERY STENT PLACEMENT: ICD-10-CM

## 2024-07-29 LAB
ALBUMIN SERPL-MCNC: 4.5 G/DL (ref 3.5–5.2)
ALP SERPL-CCNC: 89 U/L (ref 40–130)
ALT SERPL-CCNC: 22 U/L (ref 5–41)
ANION GAP SERPL CALCULATED.3IONS-SCNC: 12 MMOL/L (ref 7–19)
AST SERPL-CCNC: 18 U/L (ref 5–40)
BILIRUB SERPL-MCNC: 0.8 MG/DL (ref 0.2–1.2)
BUN SERPL-MCNC: 21 MG/DL (ref 8–23)
CALCIUM SERPL-MCNC: 9.2 MG/DL (ref 8.8–10.2)
CHLORIDE SERPL-SCNC: 106 MMOL/L (ref 98–111)
CHOLEST SERPL-MCNC: 236 MG/DL (ref 160–199)
CO2 SERPL-SCNC: 25 MMOL/L (ref 22–29)
CREAT SERPL-MCNC: 0.8 MG/DL (ref 0.5–1.2)
GLUCOSE SERPL-MCNC: 98 MG/DL (ref 74–109)
HDLC SERPL-MCNC: 38 MG/DL (ref 55–121)
LDLC SERPL CALC-MCNC: 161 MG/DL
POTASSIUM SERPL-SCNC: 4.4 MMOL/L (ref 3.5–5)
PROT SERPL-MCNC: 7.5 G/DL (ref 6.6–8.7)
SODIUM SERPL-SCNC: 143 MMOL/L (ref 136–145)
TRIGL SERPL-MCNC: 184 MG/DL (ref 0–149)

## 2024-07-29 PROCEDURE — 3078F DIAST BP <80 MM HG: CPT | Performed by: NURSE PRACTITIONER

## 2024-07-29 PROCEDURE — 3074F SYST BP LT 130 MM HG: CPT | Performed by: NURSE PRACTITIONER

## 2024-07-29 PROCEDURE — 93000 ELECTROCARDIOGRAM COMPLETE: CPT | Performed by: NURSE PRACTITIONER

## 2024-07-29 PROCEDURE — 99214 OFFICE O/P EST MOD 30 MIN: CPT | Performed by: NURSE PRACTITIONER

## 2024-07-29 PROCEDURE — 1123F ACP DISCUSS/DSCN MKR DOCD: CPT | Performed by: NURSE PRACTITIONER

## 2024-07-29 RX ORDER — METOPROLOL SUCCINATE 25 MG/1
12.5 TABLET, EXTENDED RELEASE ORAL DAILY
Qty: 30 TABLET | Refills: 5 | Status: SHIPPED | OUTPATIENT
Start: 2024-07-29

## 2024-07-29 RX ORDER — NITROGLYCERIN 0.4 MG/1
0.4 TABLET SUBLINGUAL EVERY 5 MIN PRN
Qty: 25 TABLET | Refills: 3 | Status: SHIPPED | OUTPATIENT
Start: 2024-07-29

## 2024-07-29 RX ORDER — VALSARTAN 80 MG/1
80 TABLET ORAL DAILY
Qty: 30 TABLET | Refills: 5 | Status: SHIPPED | OUTPATIENT
Start: 2024-07-29

## 2024-07-29 RX ORDER — CLOPIDOGREL BISULFATE 75 MG/1
75 TABLET ORAL DAILY
Qty: 30 TABLET | Refills: 5 | Status: CANCELLED | OUTPATIENT
Start: 2024-07-29

## 2024-07-29 NOTE — PROGRESS NOTES
[facility]  [facility]  [address], [address], [place]  [address]  [phone] office  [phone] fax      OFFICE VISIT:  2024    Jamison Schaefer - : 1945  Reason For Visit:  Jamison is a 78 y.o. male who is here for Follow-up (No symptoms) and Coronary Artery Disease    History:   Diagnosis Orders   1. Coronary artery disease involving native coronary artery of native heart without angina pectoris        2. Hypertension, unspecified type  EKG 12 lead      3. Mixed hyperlipidemia        4. History of coronary artery stent placement          The patient presents today for cardiology follow up.  The patient is doing well without chest pain, shortness of breath or decline in activity tolerance.  He enjoys hunting and push mowing the yard. The patient underwent a cath on 22 by Dr. Cade revealing single-vessel, branch disease with culprit large OM1 and obstructive  lesions in mid LAD and large second diagonal. Normal LV systolic function.  The patient had a successful PCI to OM1 (2.75 x 30 mm resolute integrity taken to 3.0 mm) utilizing drug-eluting stent; successful PCI to mid LAD (2.75 x 38 mm resolute integrity taken to 3.0  mm) utilizing drug-eluting stent and successful PCI to the second diagonal (2.5 x 18 mm resolute integrity) utilizing drug-eluting stent    The patient denies symptoms to suggest myocardial ischemia, heart failure or arrhythmia.  BP is well controlled on current regimen.  The patient does not currently have a PCP.  Lipid panel and CMP ordered for today.    Subjective  Jamison denies exertional chest pain, shortness of breath, orthopnea, paroxysmal nocturnal dyspnea, syncope, presyncope, sensed arrhythmia, edema and fatigue.  The patient denies numbness or weakness to suggest cerebrovascular accident or transient ischemic attack.      Jamison Schaefer has the following history as recorded in [facility]:  Patient Active

## 2024-07-29 NOTE — PATIENT INSTRUCTIONS
New instructions for today:  Stop and get labs done today on first floor.      How to take:  NITROGLYCERIN (Nitrostat) 0.4 mg tablets, sublingual.  Nitroglycerin is in a group of drugs called nitrates. Nitroglycerin dilates (widens) blood vessels, making it easier for blood to flow through them and easier for the heart to pump.    Dosing Guidelines for Nitroglycerin Tablets  At the start of an angina (chest pain) attack, place one tablet under the tongue or between the cheek and gum.  Do not swallow or chew the tablet; let it dissolve on its own.  If necessary, a second and third tablet may be used, with five minutes between using each tablet.  If you use a third tablet and your chest pain continues, it is time to seek immediate medical attention.  Call 911 immediately and have someone drive you to the emergency room.  You may be having a heart attack or other serious heart problem.    To prevent angina from exercise or stress, use 1 tablet 5 to 10 minutes before the activity.    Patient Instructions:  Continue current medications as prescribed.   Always keep a current medication list. Bring your medications to every office visit.   Continue to follow up with primary care provider for non cardiac medical problems.  Call the office with any problems, questions or concerns at [phone].  If you have been asked to keep a blood pressure log, do so for 2 weeks. Call the office to report readings to the triage nurse at [phone].  Follow up with cardiologist as scheduled.  The following educational material has been included in this after visit summary for your review: Life simple 7.  Heart health.     Life simple 7  1) Manage blood pressure - high blood pressure is a major risk factor for heart disease and stroke. Keeping blood pressure in health range reduces strain on your heart, arteries and kidneys.  Blood pressure goal is less than 130/80.   2) Control cholesterol - contributes to plaque, which can clog

## 2024-08-06 DIAGNOSIS — E78.2 MIXED HYPERLIPIDEMIA: Primary | ICD-10-CM

## 2025-01-27 RX ORDER — VALSARTAN 80 MG/1
80 TABLET ORAL DAILY
Qty: 30 TABLET | Refills: 5 | Status: SHIPPED | OUTPATIENT
Start: 2025-01-27

## 2025-06-04 ENCOUNTER — OFFICE VISIT (OUTPATIENT)
Dept: CARDIOLOGY CLINIC | Age: 80
End: 2025-06-04
Payer: MEDICARE

## 2025-06-04 VITALS
SYSTOLIC BLOOD PRESSURE: 138 MMHG | HEIGHT: 72 IN | BODY MASS INDEX: 27.22 KG/M2 | HEART RATE: 62 BPM | WEIGHT: 201 LBS | DIASTOLIC BLOOD PRESSURE: 80 MMHG

## 2025-06-04 DIAGNOSIS — I25.10 CORONARY ARTERY DISEASE INVOLVING NATIVE CORONARY ARTERY OF NATIVE HEART WITHOUT ANGINA PECTORIS: Primary | ICD-10-CM

## 2025-06-04 DIAGNOSIS — I10 HYPERTENSION, UNSPECIFIED TYPE: ICD-10-CM

## 2025-06-04 PROCEDURE — 3075F SYST BP GE 130 - 139MM HG: CPT | Performed by: INTERNAL MEDICINE

## 2025-06-04 PROCEDURE — 99214 OFFICE O/P EST MOD 30 MIN: CPT | Performed by: INTERNAL MEDICINE

## 2025-06-04 PROCEDURE — 1123F ACP DISCUSS/DSCN MKR DOCD: CPT | Performed by: INTERNAL MEDICINE

## 2025-06-04 PROCEDURE — 3079F DIAST BP 80-89 MM HG: CPT | Performed by: INTERNAL MEDICINE

## 2025-06-04 PROCEDURE — 1159F MED LIST DOCD IN RCRD: CPT | Performed by: INTERNAL MEDICINE

## 2025-06-04 ASSESSMENT — ENCOUNTER SYMPTOMS
BACK PAIN: 0
ABDOMINAL PAIN: 0
SHORTNESS OF BREATH: 0
WHEEZING: 0
ABDOMINAL DISTENTION: 0
VOMITING: 0
COUGH: 0
DIARRHEA: 0
BLOOD IN STOOL: 0

## 2025-06-04 NOTE — PROGRESS NOTES
[facility]  Cardiology Office Note  [address], [place]  [address]  Phone: [phone]  Fax: [phone]                            Date:  6/4/2025  Patient: Jamison Schaefer  Age:  79 y.o., 1945    Referral: No ref. provider found      PROBLEM LIST:    Patient Active Problem List    Diagnosis Date Noted    BERTHA (acute kidney injury) 11/23/2022     Priority: Medium    History of cardiac cath 11/23/2022     Priority: Medium    Lab test positive for detection of COVID-19 virus 11/23/2022     Priority: Medium    NSTEMI (non-ST elevated myocardial infarction) ([facility]) 11/19/2022     Priority: Medium     1. Coronary artery disease, non-STEMI presentation, status post PCI 11/19/2022 to OM1 (2.75 x 30 mm resolute taken to 3.0 mm), mid LAD (2.75 x 38 mm resolute, taken to 3.0 mm) and second diagonal (2.5 x 18 mm resolute) with LEXII x3, normal LV ejection fraction.  2. Hypertension.     PRESENTATION: Jamison Schaefer is a 79 y.o. year old male presents for follow-up evaluation.  Has been doing quite well over the last 3 years with no recurrent symptoms.  Has been active by his account.  Push mows the lawn and is trying to do so today as well.  No chest pain or shortness of breath.  No leg swelling.  No new medical issues reported.    REVIEW OF SYSTEMS:  Review of Systems   Constitutional:  Negative for activity change, diaphoresis and fatigue.   HENT:  Negative for hearing loss, nosebleeds and tinnitus.    Eyes:  Negative for visual disturbance.   Respiratory:  Negative for cough, shortness of breath and wheezing.    Cardiovascular:  Negative for chest pain, palpitations and leg swelling.   Gastrointestinal:  Negative for abdominal distention, abdominal pain, blood in stool, diarrhea and vomiting.   Endocrine: Negative for cold intolerance, heat intolerance, polydipsia, polyphagia and polyuria.   Genitourinary:  Negative for difficulty urinating, flank pain and hematuria.

## 2025-07-25 RX ORDER — METOPROLOL SUCCINATE 25 MG/1
12.5 TABLET, EXTENDED RELEASE ORAL DAILY
Qty: 45 TABLET | Refills: 3 | Status: SHIPPED | OUTPATIENT
Start: 2025-07-25

## 2025-07-28 RX ORDER — VALSARTAN 80 MG/1
80 TABLET ORAL DAILY
Qty: 30 TABLET | Refills: 5 | Status: SHIPPED | OUTPATIENT
Start: 2025-07-28